# Patient Record
Sex: MALE | URBAN - METROPOLITAN AREA
[De-identification: names, ages, dates, MRNs, and addresses within clinical notes are randomized per-mention and may not be internally consistent; named-entity substitution may affect disease eponyms.]

---

## 2021-09-23 ENCOUNTER — OFFICE VISIT (OUTPATIENT)
Dept: URBAN - METROPOLITAN AREA CLINIC 58 | Facility: CLINIC | Age: 71
End: 2021-09-23
Payer: MEDICARE

## 2021-09-23 DIAGNOSIS — H25.13 AGE-RELATED NUCLEAR CATARACT, BILATERAL: Primary | ICD-10-CM

## 2021-09-23 PROCEDURE — 99203 OFFICE O/P NEW LOW 30 MIN: CPT | Performed by: OPTOMETRIST

## 2021-09-23 ASSESSMENT — VISUAL ACUITY
OS: 20/30
OD: 20/25

## 2021-09-23 ASSESSMENT — INTRAOCULAR PRESSURE
OS: 20
OD: 19

## 2021-09-23 NOTE — IMPRESSION/PLAN
Impression: Age-related nuclear cataract, bilateral: H25.13. Plan: Discussed diagnosis in detail with patient. No treatment is required at this time. Will continue to observe condition and or symptoms. Call if [address] worsens.

## 2024-04-28 ENCOUNTER — OFFICE VISIT (OUTPATIENT)
Dept: URGENT CARE | Facility: PHYSICIAN GROUP | Age: 74
End: 2024-04-28
Payer: MEDICARE

## 2024-04-28 ENCOUNTER — HOSPITAL ENCOUNTER (INPATIENT)
Facility: MEDICAL CENTER | Age: 74
DRG: 981 | End: 2024-04-28
Attending: EMERGENCY MEDICINE | Admitting: STUDENT IN AN ORGANIZED HEALTH CARE EDUCATION/TRAINING PROGRAM
Payer: MEDICARE

## 2024-04-28 ENCOUNTER — APPOINTMENT (OUTPATIENT)
Dept: RADIOLOGY | Facility: IMAGING CENTER | Age: 74
End: 2024-04-28
Attending: NURSE PRACTITIONER
Payer: MEDICARE

## 2024-04-28 VITALS
BODY MASS INDEX: 29.95 KG/M2 | HEIGHT: 74 IN | TEMPERATURE: 98.9 F | SYSTOLIC BLOOD PRESSURE: 106 MMHG | HEART RATE: 65 BPM | RESPIRATION RATE: 22 BRPM | DIASTOLIC BLOOD PRESSURE: 60 MMHG | OXYGEN SATURATION: 95 % | WEIGHT: 233.4 LBS

## 2024-04-28 DIAGNOSIS — R05.8 PRODUCTIVE COUGH: ICD-10-CM

## 2024-04-28 DIAGNOSIS — I50.21 ACUTE HFREF (HEART FAILURE WITH REDUCED EJECTION FRACTION) (HCC): ICD-10-CM

## 2024-04-28 DIAGNOSIS — J18.9 MULTIFOCAL PNEUMONIA: ICD-10-CM

## 2024-04-28 DIAGNOSIS — J18.9 PNEUMONIA DUE TO INFECTIOUS ORGANISM, UNSPECIFIED LATERALITY, UNSPECIFIED PART OF LUNG: Primary | ICD-10-CM

## 2024-04-28 DIAGNOSIS — J96.01 ACUTE RESPIRATORY FAILURE WITH HYPOXIA (HCC): ICD-10-CM

## 2024-04-28 DIAGNOSIS — R06.02 SOB (SHORTNESS OF BREATH): ICD-10-CM

## 2024-04-28 PROBLEM — J45.909 REACTIVE AIRWAY DISEASE: Status: ACTIVE | Noted: 2024-04-28

## 2024-04-28 PROBLEM — Z71.89 ACP (ADVANCE CARE PLANNING): Status: ACTIVE | Noted: 2024-04-28

## 2024-04-28 PROBLEM — I10 HYPERTENSION: Status: ACTIVE | Noted: 2024-04-28

## 2024-04-28 PROBLEM — R79.89 ELEVATED TROPONIN: Status: ACTIVE | Noted: 2024-04-28

## 2024-04-28 PROBLEM — E87.6 HYPOKALEMIA: Status: ACTIVE | Noted: 2024-04-28

## 2024-04-28 PROBLEM — I48.20 CHRONIC A-FIB (HCC): Status: ACTIVE | Noted: 2024-04-28

## 2024-04-28 PROBLEM — E78.5 HYPERLIPIDEMIA: Status: ACTIVE | Noted: 2024-04-28

## 2024-04-28 PROBLEM — E03.9 HYPOTHYROIDISM: Status: ACTIVE | Noted: 2024-04-28

## 2024-04-28 PROBLEM — D72.829 LEUKOCYTOSIS: Status: ACTIVE | Noted: 2024-04-28

## 2024-04-28 LAB
ALBUMIN SERPL BCP-MCNC: 3.2 G/DL (ref 3.2–4.9)
ALBUMIN/GLOB SERPL: 0.8 G/DL
ALP SERPL-CCNC: 115 U/L (ref 30–99)
ALT SERPL-CCNC: 5 U/L (ref 2–50)
ANION GAP SERPL CALC-SCNC: 14 MMOL/L (ref 7–16)
APPEARANCE UR: ABNORMAL
AST SERPL-CCNC: 16 U/L (ref 12–45)
BACTERIA #/AREA URNS HPF: ABNORMAL /HPF
BASOPHILS # BLD AUTO: 0.3 % (ref 0–1.8)
BASOPHILS # BLD: 0.06 K/UL (ref 0–0.12)
BILIRUB SERPL-MCNC: 0.8 MG/DL (ref 0.1–1.5)
BILIRUB UR QL STRIP.AUTO: ABNORMAL
BUN SERPL-MCNC: 18 MG/DL (ref 8–22)
CALCIUM ALBUM COR SERPL-MCNC: 9.4 MG/DL (ref 8.5–10.5)
CALCIUM SERPL-MCNC: 8.8 MG/DL (ref 8.5–10.5)
CHLORIDE SERPL-SCNC: 99 MMOL/L (ref 96–112)
CO2 SERPL-SCNC: 21 MMOL/L (ref 20–33)
COLOR UR: ABNORMAL
CREAT SERPL-MCNC: 0.87 MG/DL (ref 0.5–1.4)
EKG IMPRESSION: NORMAL
EOSINOPHIL # BLD AUTO: 0.03 K/UL (ref 0–0.51)
EOSINOPHIL NFR BLD: 0.2 % (ref 0–6.9)
EPI CELLS #/AREA URNS HPF: NEGATIVE /HPF
ERYTHROCYTE [DISTWIDTH] IN BLOOD BY AUTOMATED COUNT: 51.4 FL (ref 35.9–50)
EXTRA TUBE BLU BLU: NORMAL
GFR SERPLBLD CREATININE-BSD FMLA CKD-EPI: 92 ML/MIN/1.73 M 2
GLOBULIN SER CALC-MCNC: 4.2 G/DL (ref 1.9–3.5)
GLUCOSE SERPL-MCNC: 94 MG/DL (ref 65–99)
GLUCOSE UR STRIP.AUTO-MCNC: NEGATIVE MG/DL
GRAM STN SPEC: NORMAL
HCT VFR BLD AUTO: 36.2 % (ref 42–52)
HGB BLD-MCNC: 11.4 G/DL (ref 14–18)
HYALINE CASTS #/AREA URNS LPF: ABNORMAL /LPF
IMM GRANULOCYTES # BLD AUTO: 0.17 K/UL (ref 0–0.11)
IMM GRANULOCYTES NFR BLD AUTO: 0.9 % (ref 0–0.9)
INR PPP: 1.98 (ref 0.87–1.13)
KETONES UR STRIP.AUTO-MCNC: ABNORMAL MG/DL
LACTATE SERPL-SCNC: 1.5 MMOL/L (ref 0.5–2)
LEUKOCYTE ESTERASE UR QL STRIP.AUTO: ABNORMAL
LYMPHOCYTES # BLD AUTO: 1.34 K/UL (ref 1–4.8)
LYMPHOCYTES NFR BLD: 7.3 % (ref 22–41)
MAGNESIUM SERPL-MCNC: 1.7 MG/DL (ref 1.5–2.5)
MCH RBC QN AUTO: 23.8 PG (ref 27–33)
MCHC RBC AUTO-ENTMCNC: 31.5 G/DL (ref 32.3–36.5)
MCV RBC AUTO: 75.4 FL (ref 81.4–97.8)
MICRO URNS: ABNORMAL
MONOCYTES # BLD AUTO: 1.66 K/UL (ref 0–0.85)
MONOCYTES NFR BLD AUTO: 9 % (ref 0–13.4)
NEUTROPHILS # BLD AUTO: 15.1 K/UL (ref 1.82–7.42)
NEUTROPHILS NFR BLD: 82.3 % (ref 44–72)
NITRITE UR QL STRIP.AUTO: NEGATIVE
NRBC # BLD AUTO: 0 K/UL
NRBC BLD-RTO: 0 /100 WBC (ref 0–0.2)
NT-PROBNP SERPL IA-MCNC: 2655 PG/ML (ref 0–125)
PH UR STRIP.AUTO: 5.5 [PH] (ref 5–8)
PLATELET # BLD AUTO: 445 K/UL (ref 164–446)
PMV BLD AUTO: 8.7 FL (ref 9–12.9)
POTASSIUM SERPL-SCNC: 3.3 MMOL/L (ref 3.6–5.5)
PROT SERPL-MCNC: 7.4 G/DL (ref 6–8.2)
PROT UR QL STRIP: 30 MG/DL
PROTHROMBIN TIME: 22.7 SEC (ref 12–14.6)
RBC # BLD AUTO: 4.8 M/UL (ref 4.7–6.1)
RBC # URNS HPF: >150 /HPF
RBC UR QL AUTO: ABNORMAL
SARS-COV-2 RNA RESP QL NAA+PROBE: NOTDETECTED
SIGNIFICANT IND 70042: NORMAL
SITE SITE: NORMAL
SODIUM SERPL-SCNC: 134 MMOL/L (ref 135–145)
SOURCE SOURCE: NORMAL
SP GR UR STRIP.AUTO: 1.03
SPECIMEN SOURCE: NORMAL
TROPONIN T SERPL-MCNC: 25 NG/L (ref 6–19)
UROBILINOGEN UR STRIP.AUTO-MCNC: 1 MG/DL
WBC # BLD AUTO: 18.4 K/UL (ref 4.8–10.8)
WBC #/AREA URNS HPF: ABNORMAL /HPF

## 2024-04-28 PROCEDURE — 83605 ASSAY OF LACTIC ACID: CPT

## 2024-04-28 PROCEDURE — A9270 NON-COVERED ITEM OR SERVICE: HCPCS | Performed by: EMERGENCY MEDICINE

## 2024-04-28 PROCEDURE — 770020 HCHG ROOM/CARE - TELE (206)

## 2024-04-28 PROCEDURE — 71046 X-RAY EXAM CHEST 2 VIEWS: CPT | Mod: TC | Performed by: NURSE PRACTITIONER

## 2024-04-28 PROCEDURE — 87040 BLOOD CULTURE FOR BACTERIA: CPT

## 2024-04-28 PROCEDURE — 85025 COMPLETE CBC W/AUTO DIFF WBC: CPT

## 2024-04-28 PROCEDURE — 85610 PROTHROMBIN TIME: CPT

## 2024-04-28 PROCEDURE — 700102 HCHG RX REV CODE 250 W/ 637 OVERRIDE(OP): Performed by: STUDENT IN AN ORGANIZED HEALTH CARE EDUCATION/TRAINING PROGRAM

## 2024-04-28 PROCEDURE — 87077 CULTURE AEROBIC IDENTIFY: CPT

## 2024-04-28 PROCEDURE — 99285 EMERGENCY DEPT VISIT HI MDM: CPT

## 2024-04-28 PROCEDURE — 36415 COLL VENOUS BLD VENIPUNCTURE: CPT

## 2024-04-28 PROCEDURE — 700111 HCHG RX REV CODE 636 W/ 250 OVERRIDE (IP): Mod: JZ | Performed by: STUDENT IN AN ORGANIZED HEALTH CARE EDUCATION/TRAINING PROGRAM

## 2024-04-28 PROCEDURE — 81001 URINALYSIS AUTO W/SCOPE: CPT

## 2024-04-28 PROCEDURE — 83735 ASSAY OF MAGNESIUM: CPT

## 2024-04-28 PROCEDURE — 700111 HCHG RX REV CODE 636 W/ 250 OVERRIDE (IP): Mod: JZ | Performed by: EMERGENCY MEDICINE

## 2024-04-28 PROCEDURE — 99223 1ST HOSP IP/OBS HIGH 75: CPT | Mod: 25,AI | Performed by: STUDENT IN AN ORGANIZED HEALTH CARE EDUCATION/TRAINING PROGRAM

## 2024-04-28 PROCEDURE — 700105 HCHG RX REV CODE 258: Performed by: EMERGENCY MEDICINE

## 2024-04-28 PROCEDURE — 96365 THER/PROPH/DIAG IV INF INIT: CPT

## 2024-04-28 PROCEDURE — 94669 MECHANICAL CHEST WALL OSCILL: CPT

## 2024-04-28 PROCEDURE — 700102 HCHG RX REV CODE 250 W/ 637 OVERRIDE(OP): Performed by: EMERGENCY MEDICINE

## 2024-04-28 PROCEDURE — 700105 HCHG RX REV CODE 258: Performed by: STUDENT IN AN ORGANIZED HEALTH CARE EDUCATION/TRAINING PROGRAM

## 2024-04-28 PROCEDURE — 87635 SARS-COV-2 COVID-19 AMP PRB: CPT

## 2024-04-28 PROCEDURE — 99497 ADVNCD CARE PLAN 30 MIN: CPT | Performed by: STUDENT IN AN ORGANIZED HEALTH CARE EDUCATION/TRAINING PROGRAM

## 2024-04-28 PROCEDURE — 87205 SMEAR GRAM STAIN: CPT

## 2024-04-28 PROCEDURE — 84484 ASSAY OF TROPONIN QUANT: CPT

## 2024-04-28 PROCEDURE — 87186 SC STD MICRODIL/AGAR DIL: CPT

## 2024-04-28 PROCEDURE — 93005 ELECTROCARDIOGRAM TRACING: CPT | Performed by: EMERGENCY MEDICINE

## 2024-04-28 PROCEDURE — 87147 CULTURE TYPE IMMUNOLOGIC: CPT

## 2024-04-28 PROCEDURE — 87070 CULTURE OTHR SPECIMN AEROBIC: CPT

## 2024-04-28 PROCEDURE — 93005 ELECTROCARDIOGRAM TRACING: CPT

## 2024-04-28 PROCEDURE — 80053 COMPREHEN METABOLIC PANEL: CPT

## 2024-04-28 PROCEDURE — 83880 ASSAY OF NATRIURETIC PEPTIDE: CPT

## 2024-04-28 PROCEDURE — A9270 NON-COVERED ITEM OR SERVICE: HCPCS | Performed by: STUDENT IN AN ORGANIZED HEALTH CARE EDUCATION/TRAINING PROGRAM

## 2024-04-28 RX ORDER — ALBUTEROL SULFATE 90 UG/1
1-2 AEROSOL, METERED RESPIRATORY (INHALATION) EVERY 4 HOURS PRN
Status: ON HOLD | COMMUNITY

## 2024-04-28 RX ORDER — IPRATROPIUM BROMIDE AND ALBUTEROL SULFATE 2.5; .5 MG/3ML; MG/3ML
3 SOLUTION RESPIRATORY (INHALATION)
Status: DISCONTINUED | OUTPATIENT
Start: 2024-04-28 | End: 2024-05-04 | Stop reason: HOSPADM

## 2024-04-28 RX ORDER — DOXYCYCLINE 100 MG/1
100 TABLET ORAL ONCE
Status: COMPLETED | OUTPATIENT
Start: 2024-04-28 | End: 2024-04-28

## 2024-04-28 RX ORDER — ROSUVASTATIN CALCIUM 10 MG/1
10 TABLET, COATED ORAL EVERY EVENING
Status: DISCONTINUED | OUTPATIENT
Start: 2024-04-28 | End: 2024-05-04 | Stop reason: HOSPADM

## 2024-04-28 RX ORDER — IPRATROPIUM BROMIDE AND ALBUTEROL SULFATE 2.5; .5 MG/3ML; MG/3ML
3 SOLUTION RESPIRATORY (INHALATION)
Status: DISCONTINUED | OUTPATIENT
Start: 2024-04-28 | End: 2024-04-28

## 2024-04-28 RX ORDER — LISINOPRIL AND HYDROCHLOROTHIAZIDE 20; 12.5 MG/1; MG/1
TABLET ORAL
COMMUNITY
End: 2024-04-28

## 2024-04-28 RX ORDER — ALLOPURINOL 300 MG/1
TABLET ORAL
Status: ON HOLD | COMMUNITY
Start: 2024-01-17

## 2024-04-28 RX ORDER — CYCLOBENZAPRINE HCL 5 MG
TABLET ORAL
COMMUNITY
End: 2024-04-28

## 2024-04-28 RX ORDER — MELOXICAM 15 MG/1
TABLET ORAL
COMMUNITY
End: 2024-04-28

## 2024-04-28 RX ORDER — CHOLECALCIFEROL (VITAMIN D3) 125 MCG
2000 CAPSULE ORAL DAILY
Status: ON HOLD | COMMUNITY

## 2024-04-28 RX ORDER — SOTALOL HYDROCHLORIDE 80 MG/1
80 TABLET ORAL 2 TIMES DAILY
Status: DISCONTINUED | OUTPATIENT
Start: 2024-04-28 | End: 2024-05-04 | Stop reason: HOSPADM

## 2024-04-28 RX ORDER — DOXYCYCLINE HYCLATE 100 MG/1
CAPSULE ORAL
COMMUNITY
End: 2024-04-28

## 2024-04-28 RX ORDER — FLUTICASONE FUROATE AND VILANTEROL TRIFENATATE 100; 25 UG/1; UG/1
POWDER RESPIRATORY (INHALATION)
COMMUNITY
Start: 2024-01-31 | End: 2024-04-28

## 2024-04-28 RX ORDER — ALBUTEROL SULFATE 90 UG/1
2 AEROSOL, METERED RESPIRATORY (INHALATION)
Status: DISCONTINUED | OUTPATIENT
Start: 2024-04-28 | End: 2024-05-04 | Stop reason: HOSPADM

## 2024-04-28 RX ORDER — CODEINE PHOSPHATE AND GUAIFENESIN 10; 100 MG/5ML; MG/5ML
SOLUTION ORAL
COMMUNITY
Start: 2024-04-06 | End: 2024-04-28

## 2024-04-28 RX ORDER — FUROSEMIDE 20 MG/1
TABLET ORAL
COMMUNITY
Start: 2024-02-12 | End: 2024-04-28

## 2024-04-28 RX ORDER — GUAIFENESIN/DEXTROMETHORPHAN 100-10MG/5
10 SYRUP ORAL EVERY 6 HOURS PRN
Status: DISCONTINUED | OUTPATIENT
Start: 2024-04-28 | End: 2024-05-04 | Stop reason: HOSPADM

## 2024-04-28 RX ORDER — ALLOPURINOL 300 MG/1
300 TABLET ORAL DAILY
Status: DISCONTINUED | OUTPATIENT
Start: 2024-04-29 | End: 2024-05-04 | Stop reason: HOSPADM

## 2024-04-28 RX ORDER — POTASSIUM CHLORIDE 750 MG/1
CAPSULE, EXTENDED RELEASE ORAL
COMMUNITY
Start: 2024-02-12 | End: 2024-04-28

## 2024-04-28 RX ORDER — PSEUDOEPHEDRINE HCL 30 MG
100 TABLET ORAL DAILY
Status: ON HOLD | COMMUNITY

## 2024-04-28 RX ORDER — ONDANSETRON 4 MG/1
4 TABLET, ORALLY DISINTEGRATING ORAL EVERY 4 HOURS PRN
Status: DISCONTINUED | OUTPATIENT
Start: 2024-04-28 | End: 2024-04-29

## 2024-04-28 RX ORDER — CYCLOBENZAPRINE HCL 10 MG
5 TABLET ORAL 3 TIMES DAILY PRN
Status: DISCONTINUED | OUTPATIENT
Start: 2024-04-28 | End: 2024-04-28

## 2024-04-28 RX ORDER — AZITHROMYCIN 250 MG/1
500 TABLET, FILM COATED ORAL ONCE
Status: DISCONTINUED | OUTPATIENT
Start: 2024-04-28 | End: 2024-04-28

## 2024-04-28 RX ORDER — POTASSIUM CHLORIDE 20 MEQ/1
60 TABLET, EXTENDED RELEASE ORAL ONCE
Status: COMPLETED | OUTPATIENT
Start: 2024-04-28 | End: 2024-04-28

## 2024-04-28 RX ORDER — LABETALOL HYDROCHLORIDE 5 MG/ML
10 INJECTION, SOLUTION INTRAVENOUS EVERY 4 HOURS PRN
Status: DISCONTINUED | OUTPATIENT
Start: 2024-04-28 | End: 2024-05-04 | Stop reason: HOSPADM

## 2024-04-28 RX ORDER — SODIUM CHLORIDE, SODIUM LACTATE, POTASSIUM CHLORIDE, AND CALCIUM CHLORIDE .6; .31; .03; .02 G/100ML; G/100ML; G/100ML; G/100ML
500 INJECTION, SOLUTION INTRAVENOUS
Status: DISCONTINUED | OUTPATIENT
Start: 2024-04-28 | End: 2024-04-29

## 2024-04-28 RX ORDER — SODIUM CHLORIDE, SODIUM LACTATE, POTASSIUM CHLORIDE, CALCIUM CHLORIDE 600; 310; 30; 20 MG/100ML; MG/100ML; MG/100ML; MG/100ML
INJECTION, SOLUTION INTRAVENOUS CONTINUOUS
Status: DISCONTINUED | OUTPATIENT
Start: 2024-04-28 | End: 2024-04-28

## 2024-04-28 RX ORDER — ACETAMINOPHEN 325 MG/1
650 TABLET ORAL EVERY 6 HOURS PRN
Status: DISCONTINUED | OUTPATIENT
Start: 2024-04-28 | End: 2024-05-04 | Stop reason: HOSPADM

## 2024-04-28 RX ORDER — ONDANSETRON 2 MG/ML
4 INJECTION INTRAMUSCULAR; INTRAVENOUS EVERY 4 HOURS PRN
Status: DISCONTINUED | OUTPATIENT
Start: 2024-04-28 | End: 2024-04-29

## 2024-04-28 RX ORDER — LEVOTHYROXINE SODIUM 0.2 MG/1
200 TABLET ORAL DAILY
Status: ON HOLD | COMMUNITY
Start: 2024-04-24

## 2024-04-28 RX ORDER — POLYETHYLENE GLYCOL 3350 17 G/17G
1 POWDER, FOR SOLUTION ORAL
Status: DISCONTINUED | OUTPATIENT
Start: 2024-04-28 | End: 2024-05-04 | Stop reason: HOSPADM

## 2024-04-28 RX ORDER — OMEPRAZOLE 20 MG/1
20 CAPSULE, DELAYED RELEASE ORAL 2 TIMES DAILY
Status: DISCONTINUED | OUTPATIENT
Start: 2024-04-28 | End: 2024-05-04 | Stop reason: HOSPADM

## 2024-04-28 RX ORDER — LEVOTHYROXINE SODIUM 0.15 MG/1
TABLET ORAL
COMMUNITY
End: 2024-04-28

## 2024-04-28 RX ORDER — FLUTICASONE FUROATE AND VILANTEROL 100; 25 UG/1; UG/1
1 POWDER RESPIRATORY (INHALATION)
Status: DISCONTINUED | OUTPATIENT
Start: 2024-04-28 | End: 2024-04-28

## 2024-04-28 RX ORDER — VITAMIN B COMPLEX
2000 TABLET ORAL DAILY
Status: DISCONTINUED | OUTPATIENT
Start: 2024-04-29 | End: 2024-05-04 | Stop reason: HOSPADM

## 2024-04-28 RX ORDER — SOTALOL HYDROCHLORIDE 80 MG/1
1 TABLET ORAL 2 TIMES DAILY
Status: ON HOLD | COMMUNITY
Start: 2023-11-04

## 2024-04-28 RX ORDER — AMOXICILLIN 250 MG
2 CAPSULE ORAL EVERY EVENING
Status: DISCONTINUED | OUTPATIENT
Start: 2024-04-28 | End: 2024-05-04 | Stop reason: HOSPADM

## 2024-04-28 RX ORDER — MONTELUKAST SODIUM 10 MG/1
10 TABLET ORAL DAILY
Status: ON HOLD | COMMUNITY

## 2024-04-28 RX ORDER — MONTELUKAST SODIUM 10 MG/1
10 TABLET ORAL DAILY
Status: DISCONTINUED | OUTPATIENT
Start: 2024-04-29 | End: 2024-05-04 | Stop reason: HOSPADM

## 2024-04-28 RX ORDER — ROSUVASTATIN CALCIUM 10 MG/1
TABLET, COATED ORAL
Status: ON HOLD | COMMUNITY
Start: 2024-01-17

## 2024-04-28 RX ORDER — DOXYCYCLINE 100 MG/1
100 TABLET ORAL EVERY 12 HOURS
Qty: 9 TABLET | Refills: 0 | Status: COMPLETED | OUTPATIENT
Start: 2024-04-29 | End: 2024-05-03

## 2024-04-28 RX ORDER — METHYLPREDNISOLONE 4 MG/1
TABLET ORAL
COMMUNITY
Start: 2024-03-19 | End: 2024-04-28

## 2024-04-28 RX ORDER — SODIUM CHLORIDE, SODIUM LACTATE, POTASSIUM CHLORIDE, CALCIUM CHLORIDE 600; 310; 30; 20 MG/100ML; MG/100ML; MG/100ML; MG/100ML
INJECTION, SOLUTION INTRAVENOUS CONTINUOUS
Status: DISCONTINUED | OUTPATIENT
Start: 2024-04-28 | End: 2024-04-29

## 2024-04-28 RX ORDER — LEVOTHYROXINE SODIUM 0.1 MG/1
200 TABLET ORAL DAILY
Status: DISCONTINUED | OUTPATIENT
Start: 2024-04-29 | End: 2024-05-04 | Stop reason: HOSPADM

## 2024-04-28 RX ORDER — FLUTICASONE FUROATE, UMECLIDINIUM BROMIDE AND VILANTEROL TRIFENATATE 200; 62.5; 25 UG/1; UG/1; UG/1
POWDER RESPIRATORY (INHALATION)
COMMUNITY
Start: 2024-03-13 | End: 2024-04-28

## 2024-04-28 RX ADMIN — APIXABAN 5 MG: 5 TABLET, FILM COATED ORAL at 18:49

## 2024-04-28 RX ADMIN — SODIUM CHLORIDE, POTASSIUM CHLORIDE, SODIUM LACTATE AND CALCIUM CHLORIDE: 600; 310; 30; 20 INJECTION, SOLUTION INTRAVENOUS at 16:43

## 2024-04-28 RX ADMIN — DOXYCYCLINE 100 MG: 100 TABLET, FILM COATED ORAL at 14:49

## 2024-04-28 RX ADMIN — POTASSIUM CHLORIDE 60 MEQ: 1500 TABLET, EXTENDED RELEASE ORAL at 21:57

## 2024-04-28 RX ADMIN — AMPICILLIN AND SULBACTAM 3 G: 1; 2 INJECTION, POWDER, FOR SOLUTION INTRAMUSCULAR; INTRAVENOUS at 14:50

## 2024-04-28 RX ADMIN — SENNOSIDES AND DOCUSATE SODIUM 2 TABLET: 50; 8.6 TABLET ORAL at 18:49

## 2024-04-28 RX ADMIN — SOTALOL HYDROCHLORIDE 80 MG: 80 TABLET ORAL at 18:49

## 2024-04-28 RX ADMIN — AMPICILLIN AND SULBACTAM 3 G: 1; 2 INJECTION, POWDER, FOR SOLUTION INTRAMUSCULAR; INTRAVENOUS at 23:11

## 2024-04-28 RX ADMIN — ROSUVASTATIN 10 MG: 10 TABLET, FILM COATED ORAL at 18:50

## 2024-04-28 RX ADMIN — OMEPRAZOLE 20 MG: 20 CAPSULE, DELAYED RELEASE ORAL at 18:49

## 2024-04-28 ASSESSMENT — COPD QUESTIONNAIRES
DURING THE PAST 4 WEEKS HOW MUCH DID YOU FEEL SHORT OF BREATH: NONE/LITTLE OF THE TIME
DO YOU EVER COUGH UP ANY MUCUS OR PHLEGM?: NO/ONLY WITH OCCASIONAL COLDS OR INFECTIONS
COPD SCREENING SCORE: 2
HAVE YOU SMOKED AT LEAST 100 CIGARETTES IN YOUR ENTIRE LIFE: NO/DON'T KNOW

## 2024-04-28 ASSESSMENT — ENCOUNTER SYMPTOMS
CHILLS: 1
HEADACHES: 0
DIZZINESS: 0
DEPRESSION: 0
DOUBLE VISION: 0
WEAKNESS: 1
HEARTBURN: 0
BLURRED VISION: 0
PALPITATIONS: 0
VOMITING: 0
SHORTNESS OF BREATH: 1
BRUISES/BLEEDS EASILY: 0
FEVER: 1
MYALGIAS: 1
ABDOMINAL PAIN: 0
COUGH: 1
NAUSEA: 0
SPUTUM PRODUCTION: 1

## 2024-04-28 ASSESSMENT — COGNITIVE AND FUNCTIONAL STATUS - GENERAL
DAILY ACTIVITIY SCORE: 24
MOBILITY SCORE: 24
SUGGESTED CMS G CODE MODIFIER DAILY ACTIVITY: CH
SUGGESTED CMS G CODE MODIFIER MOBILITY: CH

## 2024-04-28 ASSESSMENT — FIBROSIS 4 INDEX: FIB4 SCORE: 1.14

## 2024-04-28 ASSESSMENT — PATIENT HEALTH QUESTIONNAIRE - PHQ9
2. FEELING DOWN, DEPRESSED, IRRITABLE, OR HOPELESS: NOT AT ALL
SUM OF ALL RESPONSES TO PHQ9 QUESTIONS 1 AND 2: 0
1. LITTLE INTEREST OR PLEASURE IN DOING THINGS: NOT AT ALL

## 2024-04-28 ASSESSMENT — LIFESTYLE VARIABLES
HAVE YOU EVER FELT YOU SHOULD CUT DOWN ON YOUR DRINKING: NO
SUBSTANCE_ABUSE: 0
ON A TYPICAL DAY WHEN YOU DRINK ALCOHOL HOW MANY DRINKS DO YOU HAVE: 0
AVERAGE NUMBER OF DAYS PER WEEK YOU HAVE A DRINK CONTAINING ALCOHOL: 0
EVER FELT BAD OR GUILTY ABOUT YOUR DRINKING: NO
HAVE PEOPLE ANNOYED YOU BY CRITICIZING YOUR DRINKING: NO
DOES PATIENT WANT TO STOP DRINKING: NO
HOW MANY TIMES IN THE PAST YEAR HAVE YOU HAD 5 OR MORE DRINKS IN A DAY: 0
ALCOHOL_USE: NO
TOTAL SCORE: 0
EVER HAD A DRINK FIRST THING IN THE MORNING TO STEADY YOUR NERVES TO GET RID OF A HANGOVER: NO
TOTAL SCORE: 0
CONSUMPTION TOTAL: NEGATIVE
TOTAL SCORE: 0

## 2024-04-28 NOTE — ASSESSMENT & PLAN NOTE
-Probable demand ischemia, minimally elevated and has remained flat.   -Further ischemic workup with MPI as above.  -Continue statin and Eliquis.

## 2024-04-28 NOTE — ED PROVIDER NOTES
ER Provider Note    Scribed for David Contreras M.D. by Marita Washington. 4/28/2024   1:47 PM    Primary Care Provider: None noted    CHIEF COMPLAINT  Chief Complaint   Patient presents with    Shortness of Breath     Patient sent from urgent care for bilateral lobe pneumonia reading on XRAY. Patient reports ablation on 3/4/2024. Reports cough, shortness or breath, and mucus production since ablation. Denies chest pain and fever.     EXTERNAL RECORDS REVIEWED  The patient has a seen at urgent care and had an X ray that showed multi focal pneumonia. He was also hypoxic.    HPI/ROS    OUTSIDE HISTORIAN(S):  Sony Ly is a 73 y.o. male who presents to the ED for evaluation of shortness of breath worsening since March. The patient had an ablation on 3/4/24. He reports associated cough with green sputum. He denies any chest pain or fever. He denies any respiratory history. He recently moved here from [place]. The patient is not typically on oxygen at home.     PAST MEDICAL HISTORY  None noted    SURGICAL HISTORY  None noted    FAMILY HISTORY  None noted    SOCIAL HISTORY   reports that he has never smoked. His smokeless tobacco use includes chew. He reports that he does not currently use alcohol.    CURRENT MEDICATIONS  Previous Medications    ALBUTEROL (PROAIR HFA) 108 (90 BASE) MCG/ACT AERO SOLN INHALATION AEROSOL    0 Refill(s), Soft Stop    ALLOPURINOL (ZYLOPRIM) 300 MG TAB    Take 1 tablet every day by oral route as directed for 90 days.    APIXABAN (ELIQUIS) 5MG TAB    Take 1 Tablet by mouth 2 times a day.    BREO ELLIPTA 100-25 MCG/ACT AEROSOL POWDER, BREATH ACTIVATED        CHOLECALCIFEROL (VITAMIN D3) 50 MCG (2000 UT) TAB    Take 2,000 Units by mouth every day.    CYCLOBENZAPRINE (FLEXERIL) 5 MG TABLET    Take 1 tablet 3 times a day by oral route as needed for 10 days.    DOCUSATE SODIUM 100 MG CAP    Take 1 capsule twice a day by oral route.    FUROSEMIDE (LASIX) 20 MG TAB        IRON-FOLIC ACID PO  "   Take  by mouth.    LEVOTHYROXINE (SYNTHROID) 150 MCG TAB    Take 1 tablet every day by oral route as directed for 90 days.    LEVOTHYROXINE (SYNTHROID) 200 MCG TAB    Take 200 mcg by mouth every day.    LISINOPRIL-HYDROCHLOROTHIAZIDE (PRINZIDE) 20-12.5 MG PER TABLET    Take 1 tablet every day by oral route as directed for 90 days.    MELOXICAM (MOBIC) 15 MG TABLET    hold as of 3/18/22, 0 Refill(s), Soft Stop    METOPROLOL TARTRATE (LOPRESSOR) 100 MG TAB    Take 50 mg by mouth.    MONTELUKAST (SINGULAIR) 10 MG TAB    Take 1 tablet every day by oral route as directed for 90 days.    NEXIUM 40 MG DELAYED-RELEASE CAPSULE    Take 1 capsule every day by oral route as directed for 90 days.    POTASSIUM CHLORIDE (MICRO-K) 10 MEQ CAPSULE        ROSUVASTATIN (CRESTOR) 10 MG TAB    Take 1 tablet every day by oral route in the evening for 90 days.    SOTALOL (BETAPACE) 80 MG TAB    Take 1 Tablet by mouth 2 times a day.    TRELEGY ELLIPTA 200-62.5-25 MCG/ACT INHALER           ALLERGIES  None noted     PHYSICAL EXAM  /81   Pulse 75   Temp 36.7 °C (98 °F) (Temporal)   Resp 18   Ht 1.88 m (6' 2\")   Wt 106 kg (232 lb 12.9 oz)   SpO2 95% Comment: 4LPM  BMI 29.89 kg/m²    Nursing note and vitals reviewed.  Constitutional: Well-developed and well-nourished. No distress.   HENT: Head is normocephalic and atraumatic. Oropharynx is clear and moist without exudate or erythema.   Eyes: Pupils are equal, round, and reactive to light. Conjunctiva are normal.   Cardiovascular: Normal rate and regular rhythm. No murmur heard. Normal radial pulses.  Pulmonary/Chest: Somewhat diminished breath sounds throughout, scattered rhonchi, No wheezes or rales.   Abdominal: Soft and non-tender. No distention    Musculoskeletal: Extremities exhibit normal range of motion without edema or tenderness.   Neurological: Awake, alert and oriented to person, place, and time. No focal deficits noted.  Skin: Skin is warm and dry. No rash. "   Psychiatric: Normal mood and affect. Appropriate for clinical situation    DIAGNOSTIC STUDIES    Labs:   Results for orders placed or performed during the hospital encounter of 04/28/24   CBC with Differential   Result Value Ref Range    WBC 18.4 (H) 4.8 - 10.8 K/uL    RBC 4.80 4.70 - 6.10 M/uL    Hemoglobin 11.4 (L) 14.0 - 18.0 g/dL    Hematocrit 36.2 (L) 42.0 - 52.0 %    MCV 75.4 (L) 81.4 - 97.8 fL    MCH 23.8 (L) 27.0 - 33.0 pg    MCHC 31.5 (L) 32.3 - 36.5 g/dL    RDW 51.4 (H) 35.9 - 50.0 fL    Platelet Count 445 164 - 446 K/uL    MPV 8.7 (L) 9.0 - 12.9 fL    Neutrophils-Polys 82.30 (H) 44.00 - 72.00 %    Lymphocytes 7.30 (L) 22.00 - 41.00 %    Monocytes 9.00 0.00 - 13.40 %    Eosinophils 0.20 0.00 - 6.90 %    Basophils 0.30 0.00 - 1.80 %    Immature Granulocytes 0.90 0.00 - 0.90 %    Nucleated RBC 0.00 0.00 - 0.20 /100 WBC    Neutrophils (Absolute) 15.10 (H) 1.82 - 7.42 K/uL    Lymphs (Absolute) 1.34 1.00 - 4.80 K/uL    Monos (Absolute) 1.66 (H) 0.00 - 0.85 K/uL    Eos (Absolute) 0.03 0.00 - 0.51 K/uL    Baso (Absolute) 0.06 0.00 - 0.12 K/uL    Immature Granulocytes (abs) 0.17 (H) 0.00 - 0.11 K/uL    NRBC (Absolute) 0.00 K/uL   Comp Metabolic Panel   Result Value Ref Range    Sodium 134 (L) 135 - 145 mmol/L    Potassium 3.3 (L) 3.6 - 5.5 mmol/L    Chloride 99 96 - 112 mmol/L    Co2 21 20 - 33 mmol/L    Anion Gap 14.0 7.0 - 16.0    Glucose 94 65 - 99 mg/dL    Bun 18 8 - 22 mg/dL    Creatinine 0.87 0.50 - 1.40 mg/dL    Calcium 8.8 8.5 - 10.5 mg/dL    Correct Calcium 9.4 8.5 - 10.5 mg/dL    AST(SGOT) 16 12 - 45 U/L    ALT(SGPT) 5 2 - 50 U/L    Alkaline Phosphatase 115 (H) 30 - 99 U/L    Total Bilirubin 0.8 0.1 - 1.5 mg/dL    Albumin 3.2 3.2 - 4.9 g/dL    Total Protein 7.4 6.0 - 8.2 g/dL    Globulin 4.2 (H) 1.9 - 3.5 g/dL    A-G Ratio 0.8 g/dL   Lactic Acid   Result Value Ref Range    Lactic Acid 1.5 0.5 - 2.0 mmol/L   TROPONIN   Result Value Ref Range    Troponin T 25 (H) 6 - 19 ng/L   proBrain Natriuretic  Peptide, NT   Result Value Ref Range    NT-proBNP 2655 (H) 0 - 125 pg/mL   ESTIMATED GFR   Result Value Ref Range    GFR (CKD-EPI) 92 >60 mL/min/1.73 m 2   EKG   Result Value Ref Range    Report       [facility] Emergency Dept.    Test Date:  2024  Pt Name:    ASHLEY DELA CRUZ                 Department: ER  MRN:        [id number]                      Room:  Gender:     Male                         Technician: [id number]  :        1950                   Requested By:ER TRIAGE PROTOCOL  Order #:    [id number]                    Reading MD:    Measurements  Intervals                                Axis  Rate:       69                           P:          -26  IL:         150                          QRS:        -16  QRSD:       115                          T:          47  QT:         497  QTc:        533    Interpretive Statements  Sinus rhythm  Probable left ventricular hypertrophy  ST depr, consider ischemia, anterolateral lds  Prolonged QT interval  No previous ECG available for comparison       INITIAL ASSESSMENT AND PLAN    1:47 PM - Patient was evaluated at bedside for shortness of breath. Ordered for PNP, Troponin, Lactic Acid, Blood Culture, CBC with diff, Cmp and EKG to evaluate.     Chest x-ray consistent with multifocal pneumonia    The patient will be medicated with Ampicillin and Doxycycline 100 mg for his symptoms. The patient presents today with known pneumonia. Informed him of the plan for hospitalization for IV antibiotics. Patient verbalizes understanding and support with my plan of care.        COURSE AND MEDICAL DECISION MAKING    3:07 PM - I discussed the patient's case and the above findings with Dr. Woods (hospitalist) who will consult on the patient for hospitalization.      DISPOSITION AND DISCUSSIONS    I have discussed management of the patient with the following physicians and LEIGH's:  Dr. Woods (hospitalist)     DISPOSITION:  Patient will be hospitalized by   Peggy (hospitalist)  in guarded condition.    FINAL DIAGNOSIS  1. Multifocal pneumonia    2. Acute respiratory failure with hypoxia (HCC)         IMarita (Scribe), am scribing for, and in the presence of, David Contreras M.D..    Electronically signed by: Marita Washington (Scribe), 4/28/2024    David MADDOX M.D. personally performed the services described in this documentation, as scribed by Marita Washington in my presence, and it is both accurate and complete.      The note accurately reflects work and decisions made by me.  David Contreras M.D.  4/28/2024  7:22 PM

## 2024-04-28 NOTE — ED NOTES
Patient remains restin in bed with family at bedside. Patient uses urinal when he needs to pee. No other

## 2024-04-28 NOTE — H&P
Hospital Medicine History & Physical Note    Date of Service  4/28/2024    Primary Care Physician  Pcp Pt States None    Consultants  None    Code Status  Full Code    Chief Complaint  Chief Complaint   Patient presents with    Shortness of Breath     Patient sent from urgent care for bilateral lobe pneumonia reading on XRAY. Patient reports ablation on 3/4/2024. Reports cough, shortness or breath, and mucus production since ablation. Denies chest pain and fever.       History of Presenting Illness  Ervin Ly is a 73 y.o. male with h/o chronic afib on Eliquis, prior ablation, HTN, HLD, gout, GERD, reactive airway disease who presented 4/28/2024 with evaluation for SOB. Patient reported not feeling well for the past 3weeks, endorsed subjective fever, chills, productive sputum, weakness. He reported traveling to [place] recently, denies sick contact. He was seen at urgent care earlier, due to concern of multifocal pneumonia on CXR and hypoxia, referred to ER. In ER, he is requiring 2-4L NC. He received Unasyn and doxycyline in ER. Admission requested by ERP. Admitted to medicine service for further evaluation and treatment.    I discussed the plan of care with patient, family, bedside RN, and pharmacy.    Review of Systems  Review of Systems   Constitutional:  Positive for chills, fever and malaise/fatigue.   HENT:  Negative for hearing loss and tinnitus.    Eyes:  Negative for blurred vision and double vision.   Respiratory:  Positive for cough, sputum production and shortness of breath.    Cardiovascular:  Negative for chest pain, palpitations and leg swelling.   Gastrointestinal:  Negative for abdominal pain, heartburn, nausea and vomiting.   Genitourinary:  Negative for dysuria and urgency.   Musculoskeletal:  Positive for myalgias. Negative for joint pain.   Skin:  Negative for itching and rash.   Neurological:  Positive for weakness. Negative for dizziness and headaches.   Endo/Heme/Allergies:  Negative  for environmental allergies. Does not bruise/bleed easily.   Psychiatric/Behavioral:  Negative for depression and substance abuse.    All other systems reviewed and are negative.      Past Medical History   has no past medical history on file.    Surgical History   has no past surgical history on file.     Family History  family history is not on file.   Family history reviewed with patient. There is no family history that is pertinent to the chief complaint.     Social History   reports that he has never smoked. His smokeless tobacco use includes chew. He reports that he does not currently use alcohol.    Allergies  No Known Allergies    Medications  Prior to Admission Medications   Prescriptions Last Dose Informant Patient Reported? Taking?   BREO ELLIPTA 100-25 MCG/ACT AEROSOL POWDER, BREATH ACTIVATED   Yes No   Cholecalciferol (VITAMIN D3) 50 MCG (2000 UT) Tab   Yes No   Sig: Take 2,000 Units by mouth every day.   IRON-FOLIC ACID PO   Yes No   Sig: Take  by mouth.   NEXIUM 40 MG delayed-release capsule   Yes No   Sig: Take 1 capsule every day by oral route as directed for 90 days.   TRELEGY ELLIPTA 200-62.5-25 MCG/ACT inhaler   Yes No   albuterol (PROAIR HFA) 108 (90 Base) MCG/ACT Aero Soln inhalation aerosol   Yes No   Si Refill(s), Soft Stop   allopurinol (ZYLOPRIM) 300 MG Tab   Yes No   Sig: Take 1 tablet every day by oral route as directed for 90 days.   apixaban (ELIQUIS) 5mg Tab   Yes No   Sig: Take 1 Tablet by mouth 2 times a day.   cyclobenzaprine (FLEXERIL) 5 mg tablet   Yes No   Sig: Take 1 tablet 3 times a day by oral route as needed for 10 days.   docusate sodium 100 MG Cap   Yes No   Sig: Take 1 capsule twice a day by oral route.   furosemide (LASIX) 20 MG Tab   Yes No   levothyroxine (SYNTHROID) 150 MCG Tab   Yes No   Sig: Take 1 tablet every day by oral route as directed for 90 days.   levothyroxine (SYNTHROID) 200 MCG Tab   Yes No   Sig: Take 200 mcg by mouth every day.    lisinopril-hydrochlorothiazide (PRINZIDE) 20-12.5 MG per tablet   Yes No   Sig: Take 1 tablet every day by oral route as directed for 90 days.   meloxicam (MOBIC) 15 MG tablet   Yes No   Sig: hold as of 3/18/22, 0 Refill(s), Soft Stop   metoprolol tartrate (LOPRESSOR) 100 MG Tab   Yes No   Sig: Take 50 mg by mouth.   montelukast (SINGULAIR) 10 MG Tab   Yes No   Sig: Take 1 tablet every day by oral route as directed for 90 days.   potassium chloride (MICRO-K) 10 MEQ capsule   Yes No   rosuvastatin (CRESTOR) 10 MG Tab   Yes No   Sig: Take 1 tablet every day by oral route in the evening for 90 days.   sotalol (BETAPACE) 80 MG Tab   Yes No   Sig: Take 1 Tablet by mouth 2 times a day.      Facility-Administered Medications: None       Physical Exam  Temp:  [36.7 °C (98 °F)-37.2 °C (98.9 °F)] 36.7 °C (98 °F)  Pulse:  [] 159  Resp:  [16-22] 20  BP: (106-137)/(57-81) 108/75  SpO2:  [89 %-95 %] 94 %  Blood Pressure : 108/75   Temperature: 36.7 °C (98 °F)   Pulse: (!) 159   Respiration: 20   Pulse Oximetry: 94 %       Physical Exam  Vitals and nursing note reviewed.   Constitutional:       General: He is not in acute distress.  HENT:      Head: Normocephalic and atraumatic.      Nose: Nose normal.      Mouth/Throat:      Mouth: Mucous membranes are dry.      Pharynx: Oropharynx is clear.   Eyes:      General: No scleral icterus.     Extraocular Movements: Extraocular movements intact.   Cardiovascular:      Rate and Rhythm: Normal rate and regular rhythm.      Pulses: Normal pulses.      Heart sounds:      No friction rub.   Pulmonary:      Effort: No respiratory distress.      Breath sounds: No stridor. Rales present. No wheezing.   Chest:      Chest wall: No tenderness.   Abdominal:      General: There is no distension.      Tenderness: There is no abdominal tenderness. There is no guarding or rebound.   Musculoskeletal:         General: Normal range of motion.      Cervical back: Neck supple. No tenderness.       Right lower leg: No edema.      Left lower leg: No edema.   Skin:     General: Skin is warm and dry.   Neurological:      General: No focal deficit present.      Mental Status: He is alert and oriented to person, place, and time.   Psychiatric:         Mood and Affect: Mood normal.         Laboratory:  Recent Labs     04/28/24  1321   WBC 18.4*   RBC 4.80   HEMOGLOBIN 11.4*   HEMATOCRIT 36.2*   MCV 75.4*   MCH 23.8*   MCHC 31.5*   RDW 51.4*   PLATELETCT 445   MPV 8.7*     Recent Labs     04/28/24  1321   SODIUM 134*   POTASSIUM 3.3*   CHLORIDE 99   CO2 21   GLUCOSE 94   BUN 18   CREATININE 0.87   CALCIUM 8.8     Recent Labs     04/28/24  1321   ALTSGPT 5   ASTSGOT 16   ALKPHOSPHAT 115*   TBILIRUBIN 0.8   GLUCOSE 94         Recent Labs     04/28/24  1321   NTPROBNP 2655*         Recent Labs     04/28/24  1321   TROPONINT 25*       Imaging:  EC-ECHOCARDIOGRAM COMPLETE W/O CONT    (Results Pending)             X-Ray:  I have personally reviewed the images and compared with prior images.  EKG:  I have personally reviewed the images and compared with prior images.    Assessment/Plan:  Justification for Admission Status  I anticipate this patient will require at least 2 midnights hospitalization, therefore appropriate for inpatient status.      * Multifocal pneumonia- (present on admission)  Assessment & Plan  WBC 18.4 K, concern of bilateral infiltrates on CXR, requiring 4 L nasal cannula support  Follow cultures  Antibiotic: Unasyn, doxycycline    Acute respiratory failure with hypoxia (HCC)  Assessment & Plan  Requiring 2 to 4 L nasal cannula support in ER  - Wean off as tolerated  Likely secondary to pneumonia  -check COVID/influenza  Check echo    Reactive airway disease  Assessment & Plan  Continue home regimen  Currently not in acute exacerbation    Hypertension  Assessment & Plan  Home meds    Leukocytosis  Assessment & Plan  IVF, antibiotic  Trend    Chronic a-fib (HCC)  Assessment & Plan  Continue Eliquis,  sotalol    Hypokalemia  Assessment & Plan  Likely due to HCTZ use  Replace  Place Mg    ACP (advance care planning)  Assessment & Plan  Goal of care discussed with patient and patient's wife in ER. Patient is agreeable noninvasive as well as invasive/heroic life-sustaining measures--including CPR/defibrillation/intubation or mechanical ventilation if needed. He is also agreeable for IV antibiotic, breathing treatment as needed, any further medical management as clinically warranted. Diagnosis, prognosis, questions and concerns addressed. FULL code status confirmed with patient. ACP: 16minutes.    Elevated troponin  Assessment & Plan  Probable demand ischemia  Trend CE, EKG  Eliquis, statin  Check echo    Hypothyroidism  Assessment & Plan  Synthroid    Hyperlipidemia  Assessment & Plan  Rosuvastatin      VTE prophylaxis: therapeutic anticoagulation with Eliquis

## 2024-04-28 NOTE — ASSESSMENT & PLAN NOTE
-Multifactorial from pneumonia and HFrEF.  -Continue antibiotics and diuresis.  -Further cardiac ischemic workup as above.  -Continue respiratory support with RT protocol.  Continue supplemental oxygen, keep saturation above 88% and wean as able.

## 2024-04-28 NOTE — ASSESSMENT & PLAN NOTE
- Chest x-ray showed bilateral perihilar and lower lobe parenchymal opacities.  Still requiring supplemental oxygen.  Sputum cultures growing MSSA.  -Continue antibiotics with IV Unasyn and doxycycline.  -Continue respiratory support with RT protocol, supplemental oxygen.  Keep saturations above 88%, wean as able from O2.   -Trend WBC count and procalcitonin.

## 2024-04-28 NOTE — ED NOTES
Michael brought to room via wheelchair for comfort, gowned, and placed on monitors (BP, Cardiac, Pulse Ox). Patient is low fall risk. Standard fall risk precautions in place including bed in lowest position, side rails up, call light within reach, and area free of clutter. Patient requires 2L O2 NC to maintain SPO2 >90%. Chart up for ERP. Patient presented at 87% on RA

## 2024-04-28 NOTE — ED TRIAGE NOTES
Chief Complaint   Patient presents with    Shortness of Breath     Patient sent from urgent care for bilateral lobe pneumonia reading on XRAY. Patient reports ablation on 3/4/2024. Reports cough, shortness or breath, and mucus production since ablation. Denies chest pain and fever.     Patient placed on 4L NC in lobby to maintain SpO2 > 90%. Denies home O2 use.

## 2024-04-28 NOTE — ASSESSMENT & PLAN NOTE
-Maintaining good blood pressure control.  Continue sotalol. Monitor blood pressure trend closely, continue as needed IV labetalol for significant symptomatic hypertension.  Optimize blood pressure control.

## 2024-04-28 NOTE — PROGRESS NOTES
Ervin Ly is a 73 y.o. male who presents for Cough (Phlegm, congestion, low O2, difficulty taking deep breath, x2 weeks )    Accompanied by his niece.   HPI  This is a new problem. Ervin Ly is a 73 y.o. patient who presents to urgent care with c/o: 3-4 weeks of productive cough. Yesterday felt sob. O2Spo2 mid 80's to 89. Feeling light headed. No fevers.   Ablation in March of 2024. The next few days he had tickle in through and then started to get mucous in throat. Cough started after that.  He is currently traveling in . Came from [place] last night.       ROS See HPI    Allergies:     No Known Allergies    PMSFS Hx:  History reviewed. No pertinent past medical history.  History reviewed. No pertinent surgical history.  History reviewed. No pertinent family history.  Social History     Tobacco Use    Smoking status: Never    Smokeless tobacco: Current     Types: Chew   Substance Use Topics    Alcohol use: Not Currently       Problems:   There is no problem list on file for this patient.      Medications:   Current Outpatient Medications on File Prior to Visit   Medication Sig Dispense Refill    albuterol (PROAIR HFA) 108 (90 Base) MCG/ACT Aero Soln inhalation aerosol 0 Refill(s), Soft Stop      allopurinol (ZYLOPRIM) 300 MG Tab Take 1 tablet every day by oral route as directed for 90 days.      apixaban (ELIQUIS) 5mg Tab Take 1 Tablet by mouth 2 times a day.      Cholecalciferol (VITAMIN D3) 50 MCG (2000 UT) Tab Take 2,000 Units by mouth every day.      cyclobenzaprine (FLEXERIL) 5 mg tablet Take 1 tablet 3 times a day by oral route as needed for 10 days.      docusate sodium 100 MG Cap Take 1 capsule twice a day by oral route.      NEXIUM 40 MG delayed-release capsule Take 1 capsule every day by oral route as directed for 90 days.      BREO ELLIPTA 100-25 MCG/ACT AEROSOL POWDER, BREATH ACTIVATED       TRELEGY ELLIPTA 200-62.5-25 MCG/ACT inhaler       furosemide (LASIX) 20 MG Tab        "levothyroxine (SYNTHROID) 150 MCG Tab Take 1 tablet every day by oral route as directed for 90 days.      levothyroxine (SYNTHROID) 200 MCG Tab Take 200 mcg by mouth every day.      lisinopril-hydrochlorothiazide (PRINZIDE) 20-12.5 MG per tablet Take 1 tablet every day by oral route as directed for 90 days.      meloxicam (MOBIC) 15 MG tablet hold as of 3/18/22, 0 Refill(s), Soft Stop      metoprolol tartrate (LOPRESSOR) 100 MG Tab Take 50 mg by mouth.      montelukast (SINGULAIR) 10 MG Tab Take 1 tablet every day by oral route as directed for 90 days.      potassium chloride (MICRO-K) 10 MEQ capsule       rosuvastatin (CRESTOR) 10 MG Tab Take 1 tablet every day by oral route in the evening for 90 days.      sotalol (BETAPACE) 80 MG Tab Take 1 Tablet by mouth 2 times a day.      IRON-FOLIC ACID PO Take  by mouth.       No current facility-administered medications on file prior to visit.        Objective:     /60 (BP Location: Left arm, Patient Position: Sitting, BP Cuff Size: Adult)   Pulse 68   Temp 37.2 °C (98.9 °F) (Temporal)   Resp 16   Ht 1.88 m (6' 2\")   Wt 106 kg (233 lb 6.4 oz)   SpO2 89%   BMI 29.97 kg/m²     Physical Exam  Nursing note reviewed.   Constitutional:       General: He is not in acute distress.     Appearance: Normal appearance. He is well-developed and well-groomed. He is not toxic-appearing.   HENT:      Head: Normocephalic and atraumatic.      Right Ear: External ear normal.      Left Ear: External ear normal.      Nose: Nose normal.   Eyes:      General:         Right eye: No discharge.         Left eye: No discharge.      Conjunctiva/sclera: Conjunctivae normal.      Pupils: Pupils are equal, round, and reactive to light.   Cardiovascular:      Rate and Rhythm: Normal rate and regular rhythm.      Pulses: Normal pulses.      Heart sounds: Normal heart sounds.   Pulmonary:      Effort: Pulmonary effort is normal. No accessory muscle usage or respiratory distress.      Breath " sounds: Decreased air movement present. Decreased breath sounds and rhonchi present. No wheezing.   Musculoskeletal:      Cervical back: Neck supple.   Lymphadenopathy:      Cervical: No cervical adenopathy.      Upper Body:      Right upper body: No supraclavicular adenopathy.      Left upper body: No supraclavicular adenopathy.   Skin:     General: Skin is warm and dry.      Capillary Refill: Capillary refill takes less than 2 seconds.   Neurological:      Mental Status: He is alert and oriented to person, place, and time.   Psychiatric:         Mood and Affect: Mood normal.         Behavior: Behavior normal.       .    RADIOLOGY RESULTS   DX-CHEST-2 VIEWS    Result Date: 4/28/2024 4/28/2024 11:57 AM HISTORY/REASON FOR EXAM:  Shortness of Breath; productive cough, Low oxygen saturation TECHNIQUE/EXAM DESCRIPTION AND NUMBER OF VIEWS: Two views of the chest. COMPARISON:  None available. FINDINGS: The mediastinal and cardiac silhouette is upper limits of normal. There is perihilar bronchial wall thickening. There are patchy mid and lower hemithorax parenchymal opacities and interstitial opacities. There is no significant pleural effusion. There is no visible pneumothorax. There are no acute bony abnormalities.     1.  There are bilateral perihilar and lower lobe parenchymal opacities which could be due to combination of edema and multifocal pneumonia.         Xray: Reviewed and interpreted independently by me. I agree with the radiologist's findings.     Assessment /Associated Orders:      1. Pneumonia due to infectious organism, unspecified laterality, unspecified part of lung  UC AMA/Refusal of Treatment      2. SOB (shortness of breath)  DX-CHEST-2 VIEWS    UC AMA/Refusal of Treatment      3. Productive cough  DX-CHEST-2 VIEWS    UC AMA/Refusal of Treatment          Medical Decision Making:    Ervin  is a very pleasant 73 y.o. male   Pt's clinical presentation and exam today indicate a need for higher level of  care with further evaluation and/or diagnostics.  He is requiring supplemental oxygen in urgent care today.  [facility] was  called to arrange transfer to higher level of care in ER.  Pt is to be transported via POV.   I have reiterated to patient that although an Urgent Care to ER transfer was made this will not necessarily expedite the ER process        Please note that this dictation was created using voice recognition software. I have worked with consultants from the vendor as well as technical experts from [facility] to optimize the interface. I have made every reasonable attempt to correct obvious errors, but I expect that there are errors of grammar and possibly content that I did not discover before finalizing the note.  This note was electronically signed by provider

## 2024-04-28 NOTE — ASSESSMENT & PLAN NOTE
- Maintaining rate control.  Continue sotalol.  Continue anticoagulation with Eliquis.  Continue to monitor on telemetry.

## 2024-04-28 NOTE — ASSESSMENT & PLAN NOTE
- At an acute exacerbation.  Continue as needed bronchodilators, along with home Singulair.  -Continue RT protocol.

## 2024-04-28 NOTE — PROGRESS NOTES
Med Rec complete per pt  Allergies Reviewed    Pt takes SOTALOL and METOPROLOL    Pt takes ELIQUIS last dose 4/28 AM

## 2024-04-28 NOTE — ASSESSMENT & PLAN NOTE
- Replace with 40 mEq of oral K-Dur.  Magnesium level was normal.  -Continue to monitor, BMP in the morning.

## 2024-04-29 ENCOUNTER — APPOINTMENT (OUTPATIENT)
Dept: CARDIOLOGY | Facility: MEDICAL CENTER | Age: 74
DRG: 981 | End: 2024-04-29
Attending: STUDENT IN AN ORGANIZED HEALTH CARE EDUCATION/TRAINING PROGRAM
Payer: MEDICARE

## 2024-04-29 PROBLEM — I50.9 CHF (CONGESTIVE HEART FAILURE) (HCC): Status: ACTIVE | Noted: 2024-04-29

## 2024-04-29 PROBLEM — R94.31 QT PROLONGATION: Status: ACTIVE | Noted: 2024-04-29

## 2024-04-29 LAB
ANION GAP SERPL CALC-SCNC: 12 MMOL/L (ref 7–16)
APPEARANCE UR: CLEAR
BACTERIA #/AREA URNS HPF: NEGATIVE /HPF
BACTERIA BLD CULT: NORMAL
BACTERIA BLD CULT: NORMAL
BASOPHILS # BLD AUTO: 0.4 % (ref 0–1.8)
BASOPHILS # BLD: 0.06 K/UL (ref 0–0.12)
BILIRUB UR QL STRIP.AUTO: ABNORMAL
BUN SERPL-MCNC: 14 MG/DL (ref 8–22)
CALCIUM SERPL-MCNC: 8.7 MG/DL (ref 8.5–10.5)
CHLORIDE SERPL-SCNC: 102 MMOL/L (ref 96–112)
CO2 SERPL-SCNC: 24 MMOL/L (ref 20–33)
COLOR UR: ABNORMAL
CREAT SERPL-MCNC: 0.71 MG/DL (ref 0.5–1.4)
EOSINOPHIL # BLD AUTO: 0.05 K/UL (ref 0–0.51)
EOSINOPHIL NFR BLD: 0.3 % (ref 0–6.9)
EPI CELLS #/AREA URNS HPF: ABNORMAL /HPF
ERYTHROCYTE [DISTWIDTH] IN BLOOD BY AUTOMATED COUNT: 52.6 FL (ref 35.9–50)
ERYTHROCYTE [DISTWIDTH] IN BLOOD BY AUTOMATED COUNT: 52.6 FL (ref 35.9–50)
GFR SERPLBLD CREATININE-BSD FMLA CKD-EPI: 98 ML/MIN/1.73 M 2
GLUCOSE SERPL-MCNC: 123 MG/DL (ref 65–99)
GLUCOSE UR STRIP.AUTO-MCNC: NEGATIVE MG/DL
HCT VFR BLD AUTO: 33 % (ref 42–52)
HCT VFR BLD AUTO: 33 % (ref 42–52)
HEMOCCULT STL QL: NEGATIVE
HGB BLD-MCNC: 10.3 G/DL (ref 14–18)
HGB BLD-MCNC: 10.3 G/DL (ref 14–18)
HYALINE CASTS #/AREA URNS LPF: ABNORMAL /LPF
IMM GRANULOCYTES # BLD AUTO: 0.1 K/UL (ref 0–0.11)
IMM GRANULOCYTES NFR BLD AUTO: 0.6 % (ref 0–0.9)
IRON SATN MFR SERPL: 10 % (ref 15–55)
IRON SERPL-MCNC: 16 UG/DL (ref 50–180)
KETONES UR STRIP.AUTO-MCNC: ABNORMAL MG/DL
LEUKOCYTE ESTERASE UR QL STRIP.AUTO: ABNORMAL
LV EJECT FRACT  99904: 45
LV EJECT FRACT MOD 2C 99903: 46.46
LV EJECT FRACT MOD 4C 99902: 45.26
LV EJECT FRACT MOD BP 99901: 44.97
LYMPHOCYTES # BLD AUTO: 1.17 K/UL (ref 1–4.8)
LYMPHOCYTES NFR BLD: 7.3 % (ref 22–41)
MCH RBC QN AUTO: 24 PG (ref 27–33)
MCH RBC QN AUTO: 24 PG (ref 27–33)
MCHC RBC AUTO-ENTMCNC: 31.2 G/DL (ref 32.3–36.5)
MCHC RBC AUTO-ENTMCNC: 31.2 G/DL (ref 32.3–36.5)
MCV RBC AUTO: 76.7 FL (ref 81.4–97.8)
MCV RBC AUTO: 76.7 FL (ref 81.4–97.8)
MICRO URNS: ABNORMAL
MONOCYTES # BLD AUTO: 1.65 K/UL (ref 0–0.85)
MONOCYTES NFR BLD AUTO: 10.3 % (ref 0–13.4)
MUCOUS THREADS #/AREA URNS HPF: ABNORMAL /HPF
NEUTROPHILS # BLD AUTO: 12.95 K/UL (ref 1.82–7.42)
NEUTROPHILS NFR BLD: 81.1 % (ref 44–72)
NITRITE UR QL STRIP.AUTO: NEGATIVE
NRBC # BLD AUTO: 0 K/UL
NRBC BLD-RTO: 0 /100 WBC (ref 0–0.2)
PH UR STRIP.AUTO: 5.5 [PH] (ref 5–8)
PLATELET # BLD AUTO: 348 K/UL (ref 164–446)
PLATELET # BLD AUTO: 348 K/UL (ref 164–446)
PMV BLD AUTO: 8.6 FL (ref 9–12.9)
PMV BLD AUTO: 8.6 FL (ref 9–12.9)
POTASSIUM SERPL-SCNC: 3.6 MMOL/L (ref 3.6–5.5)
PROCALCITONIN SERPL-MCNC: 0.13 NG/ML
PROT UR QL STRIP: NEGATIVE MG/DL
RBC # BLD AUTO: 4.3 M/UL (ref 4.7–6.1)
RBC # BLD AUTO: 4.3 M/UL (ref 4.7–6.1)
RBC # URNS HPF: ABNORMAL /HPF
RBC UR QL AUTO: NEGATIVE
SIGNIFICANT IND 70042: NORMAL
SIGNIFICANT IND 70042: NORMAL
SITE SITE: NORMAL
SITE SITE: NORMAL
SODIUM SERPL-SCNC: 138 MMOL/L (ref 135–145)
SOURCE SOURCE: NORMAL
SOURCE SOURCE: NORMAL
SP GR UR STRIP.AUTO: 1.03
TIBC SERPL-MCNC: 157 UG/DL (ref 250–450)
TROPONIN T SERPL-MCNC: 25 NG/L (ref 6–19)
TSH SERPL DL<=0.005 MIU/L-ACNC: 0.43 UIU/ML (ref 0.38–5.33)
UIBC SERPL-MCNC: 141 UG/DL (ref 110–370)
UROBILINOGEN UR STRIP.AUTO-MCNC: 1 MG/DL
WBC # BLD AUTO: 15.5 K/UL (ref 4.8–10.8)
WBC # BLD AUTO: 15.5 K/UL (ref 4.8–10.8)
WBC #/AREA URNS HPF: ABNORMAL /HPF
YEAST BUDDING URNS QL: PRESENT /HPF

## 2024-04-29 PROCEDURE — 700105 HCHG RX REV CODE 258: Performed by: STUDENT IN AN ORGANIZED HEALTH CARE EDUCATION/TRAINING PROGRAM

## 2024-04-29 PROCEDURE — 84484 ASSAY OF TROPONIN QUANT: CPT

## 2024-04-29 PROCEDURE — 84443 ASSAY THYROID STIM HORMONE: CPT

## 2024-04-29 PROCEDURE — 700102 HCHG RX REV CODE 250 W/ 637 OVERRIDE(OP): Performed by: STUDENT IN AN ORGANIZED HEALTH CARE EDUCATION/TRAINING PROGRAM

## 2024-04-29 PROCEDURE — 82272 OCCULT BLD FECES 1-3 TESTS: CPT

## 2024-04-29 PROCEDURE — 99222 1ST HOSP IP/OBS MODERATE 55: CPT | Performed by: INTERNAL MEDICINE

## 2024-04-29 PROCEDURE — 99232 SBSQ HOSP IP/OBS MODERATE 35: CPT | Performed by: HOSPITALIST

## 2024-04-29 PROCEDURE — 83550 IRON BINDING TEST: CPT

## 2024-04-29 PROCEDURE — 770020 HCHG ROOM/CARE - TELE (206)

## 2024-04-29 PROCEDURE — 83540 ASSAY OF IRON: CPT

## 2024-04-29 PROCEDURE — 700102 HCHG RX REV CODE 250 W/ 637 OVERRIDE(OP): Mod: JZ | Performed by: HOSPITALIST

## 2024-04-29 PROCEDURE — 93306 TTE W/DOPPLER COMPLETE: CPT

## 2024-04-29 PROCEDURE — 80048 BASIC METABOLIC PNL TOTAL CA: CPT

## 2024-04-29 PROCEDURE — 700111 HCHG RX REV CODE 636 W/ 250 OVERRIDE (IP): Mod: JZ | Performed by: STUDENT IN AN ORGANIZED HEALTH CARE EDUCATION/TRAINING PROGRAM

## 2024-04-29 PROCEDURE — 93306 TTE W/DOPPLER COMPLETE: CPT | Mod: 26 | Performed by: INTERNAL MEDICINE

## 2024-04-29 PROCEDURE — 700111 HCHG RX REV CODE 636 W/ 250 OVERRIDE (IP): Mod: JZ | Performed by: HOSPITALIST

## 2024-04-29 PROCEDURE — A9270 NON-COVERED ITEM OR SERVICE: HCPCS | Performed by: STUDENT IN AN ORGANIZED HEALTH CARE EDUCATION/TRAINING PROGRAM

## 2024-04-29 PROCEDURE — 700117 HCHG RX CONTRAST REV CODE 255: Performed by: STUDENT IN AN ORGANIZED HEALTH CARE EDUCATION/TRAINING PROGRAM

## 2024-04-29 PROCEDURE — A9270 NON-COVERED ITEM OR SERVICE: HCPCS | Mod: JZ | Performed by: HOSPITALIST

## 2024-04-29 PROCEDURE — 36415 COLL VENOUS BLD VENIPUNCTURE: CPT

## 2024-04-29 PROCEDURE — 84145 PROCALCITONIN (PCT): CPT

## 2024-04-29 PROCEDURE — 94669 MECHANICAL CHEST WALL OSCILL: CPT

## 2024-04-29 PROCEDURE — 85025 COMPLETE CBC W/AUTO DIFF WBC: CPT

## 2024-04-29 RX ORDER — FUROSEMIDE 10 MG/ML
20 INJECTION INTRAMUSCULAR; INTRAVENOUS
Status: DISCONTINUED | OUTPATIENT
Start: 2024-04-29 | End: 2024-05-01

## 2024-04-29 RX ORDER — POTASSIUM CHLORIDE 20 MEQ/1
20 TABLET, EXTENDED RELEASE ORAL ONCE
Status: COMPLETED | OUTPATIENT
Start: 2024-04-29 | End: 2024-04-29

## 2024-04-29 RX ORDER — MAGNESIUM SULFATE HEPTAHYDRATE 40 MG/ML
2 INJECTION, SOLUTION INTRAVENOUS ONCE
Status: COMPLETED | OUTPATIENT
Start: 2024-04-29 | End: 2024-04-29

## 2024-04-29 RX ADMIN — DOXYCYCLINE 100 MG: 100 TABLET, FILM COATED ORAL at 05:18

## 2024-04-29 RX ADMIN — AMPICILLIN AND SULBACTAM 3 G: 1; 2 INJECTION, POWDER, FOR SOLUTION INTRAMUSCULAR; INTRAVENOUS at 12:25

## 2024-04-29 RX ADMIN — SOTALOL HYDROCHLORIDE 80 MG: 80 TABLET ORAL at 05:17

## 2024-04-29 RX ADMIN — MAGNESIUM SULFATE HEPTAHYDRATE 2 G: 2 INJECTION, SOLUTION INTRAVENOUS at 15:43

## 2024-04-29 RX ADMIN — LEVOTHYROXINE SODIUM 200 MCG: 0.1 TABLET ORAL at 05:18

## 2024-04-29 RX ADMIN — ROSUVASTATIN 10 MG: 10 TABLET, FILM COATED ORAL at 18:29

## 2024-04-29 RX ADMIN — APIXABAN 5 MG: 5 TABLET, FILM COATED ORAL at 05:17

## 2024-04-29 RX ADMIN — MONTELUKAST 10 MG: 10 TABLET, FILM COATED ORAL at 05:19

## 2024-04-29 RX ADMIN — AMPICILLIN AND SULBACTAM 3 G: 1; 2 INJECTION, POWDER, FOR SOLUTION INTRAMUSCULAR; INTRAVENOUS at 18:25

## 2024-04-29 RX ADMIN — OMEPRAZOLE 20 MG: 20 CAPSULE, DELAYED RELEASE ORAL at 05:19

## 2024-04-29 RX ADMIN — OMEPRAZOLE 20 MG: 20 CAPSULE, DELAYED RELEASE ORAL at 18:29

## 2024-04-29 RX ADMIN — Medication 2000 UNITS: at 05:17

## 2024-04-29 RX ADMIN — AMPICILLIN AND SULBACTAM 3 G: 1; 2 INJECTION, POWDER, FOR SOLUTION INTRAMUSCULAR; INTRAVENOUS at 05:23

## 2024-04-29 RX ADMIN — ALLOPURINOL 300 MG: 300 TABLET ORAL at 05:17

## 2024-04-29 RX ADMIN — SOTALOL HYDROCHLORIDE 80 MG: 80 TABLET ORAL at 18:29

## 2024-04-29 RX ADMIN — APIXABAN 5 MG: 5 TABLET, FILM COATED ORAL at 18:29

## 2024-04-29 RX ADMIN — HUMAN ALBUMIN MICROSPHERES AND PERFLUTREN 3 ML: 10; .22 INJECTION, SOLUTION INTRAVENOUS at 14:45

## 2024-04-29 RX ADMIN — SENNOSIDES AND DOCUSATE SODIUM 2 TABLET: 50; 8.6 TABLET ORAL at 18:29

## 2024-04-29 RX ADMIN — POTASSIUM CHLORIDE 20 MEQ: 1500 TABLET, EXTENDED RELEASE ORAL at 15:34

## 2024-04-29 RX ADMIN — DOXYCYCLINE 100 MG: 100 TABLET, FILM COATED ORAL at 18:29

## 2024-04-29 RX ADMIN — FUROSEMIDE 20 MG: 10 INJECTION INTRAMUSCULAR; INTRAVENOUS at 15:36

## 2024-04-29 ASSESSMENT — ENCOUNTER SYMPTOMS
WEIGHT LOSS: 0
DEPRESSION: 0
ABDOMINAL PAIN: 0
SHORTNESS OF BREATH: 1
VOMITING: 0
SPUTUM PRODUCTION: 1
NAUSEA: 0
MUSCULOSKELETAL NEGATIVE: 1
MYALGIAS: 0
COUGH: 1
GASTROINTESTINAL NEGATIVE: 1
PSYCHIATRIC NEGATIVE: 1
BRUISES/BLEEDS EASILY: 0
DIAPHORESIS: 0
CONSTITUTIONAL NEGATIVE: 1
EYES NEGATIVE: 1
PALPITATIONS: 0
BLURRED VISION: 0
WEAKNESS: 0
WEAKNESS: 1
FEVER: 0
NEUROLOGICAL NEGATIVE: 1
DIZZINESS: 0
SORE THROAT: 0
FOCAL WEAKNESS: 0
CHILLS: 0
FATIGUE: 1
SHORTNESS OF BREATH: 0
CHEST TIGHTNESS: 0
HEADACHES: 0
ORTHOPNEA: 1

## 2024-04-29 ASSESSMENT — CHA2DS2 SCORE
SEX: MALE
AGE 75 OR GREATER: NO
CHF OR LEFT VENTRICULAR DYSFUNCTION: YES
PRIOR STROKE OR TIA OR THROMBOEMBOLISM: NO
CHA2DS2 VASC SCORE: 3
VASCULAR DISEASE: NO
HYPERTENSION: YES
AGE 65 TO 74: YES
DIABETES: NO

## 2024-04-29 ASSESSMENT — FIBROSIS 4 INDEX: FIB4 SCORE: 1.14

## 2024-04-29 ASSESSMENT — LIFESTYLE VARIABLES: SUBSTANCE_ABUSE: 0

## 2024-04-29 NOTE — CARE PLAN
Problem: Knowledge Deficit - Standard  Goal: Patient and family/care givers will demonstrate understanding of plan of care, disease process/condition, diagnostic tests and medications  Outcome: Progressing     Problem: Fall Risk  Goal: Patient will remain free from falls  Outcome: Progressing     Problem: Skin Integrity  Goal: Skin integrity is maintained or improved  Outcome: Progressing     Problem: Pain - Standard  Goal: Alleviation of pain or a reduction in pain to the patient’s comfort goal  Outcome: Progressing   The patient is Stable - Low risk of patient condition declining or worsening    Shift Goals  Clinical Goals: Remain free of falls, no skin breakdown  Patient Goals: Rest  Family Goals: CARLY    Progress made toward(s) clinical / shift goals:     Pt educated on plan of care, pt verbalizes understanding, reinforcement needed. Pt educated on pain/anxiety scales, alleviating factors, pain/anxiety medications, and side effects, and need for pain/anxiety reassessment, pt pain/anxiety controlled at this time, reinforcement needed. Pt educated on the need to reposition frequently and remain dry to avoid skin breakdown, reinforcement needed, no further skin breakdown during shift. Fall risk education provided to pt, pt educated about the need to call for assistance while attempting to ambulate, reinforcement needed, pt remains free of falls this shift.

## 2024-04-29 NOTE — CONSULTS
"Cardiology Initial Consultation    Date of Service  4/29/2024    Referring Physician  Maci Garcia M.D.    Reason for Consultation  Abnormal echocardiogram    History of Presenting Illness  Ervin Ly is a 73 y.o. male with a past medical history of atrial fibrillation diagnosed 9/2023, DCCV ([facility]), Afib ablation 3/4/2024 ([facility], Todd Vilchis MD), GI bleed 2023 ([place], [place]) who presented 4/28/2024 with persistent productive cough, hypoxia, abnormal chest x-ray diagnosed with pneumonia and started on antibiotics.  Had a abnormal echocardiogram showing left ventricular ejection fraction of 45% mild aortic stenosis.  Cardiology consultation was requested.    The patient presents with 3 to 4 weeks of persistent productive cough bringing up \"green sludge junk\".  He came into the ER at the urging of his family members.  He is felt fatigued, tired with loss of stamina particularly in the last 3 to 4 days.  Denies specifically shortness of breath or anginal chest pain symptoms.  No PND, orthopnea or LE edema. Currently feels better with oxygen and antibiotics.    The patient reports that he was hospitalized at [facility] in 9/2023 with a new diagnosis of atrial fibrillation presenting with syncope suffering fractured ribs.  Underwent DCCV.  In [place] underwent Afib ablation 3/4/2024 placed on sotalol and continued on apixaban.  States prior to this he underwent a \"a bunch of tests\" including what he thinks was a \"chemical stress test\" done on 12/4/2023 which reportedly demonstrated \"a small fixed defect\" and LVEF of 33% with a contemporaneous echocardiogram showing LVEF of 50-55%.  Pre-Afib ablation chest CTA was abnormal for pulmonary nodules and inflammatory changes.    Prior to this in the summer 2023 the patient reports being hospitalized at [place], [place] for rectal bleeding.  Underwent colonoscopy and possibly had polyps removed but stated he " needed a follow-up which she had not done.  His PCP had placed him on iron that he ran out of approximately a week or 2 ago.    History of dyslipidemia on rosuvastatin.  Status post I-131 20 years ago on chronic thyroid supplementation.  No hypertension, diabetes mellitus, cigarette use, significant alcohol use or illicit drug use.  Denies COPD, DVT or pulmonary embolus.  Has been on bronchodilator therapy on medication list.    Review of Systems  Review of Systems   Constitutional:  Positive for fatigue.   Respiratory:  Positive for cough. Negative for chest tightness and shortness of breath.    Cardiovascular:  Negative for palpitations.   Gastrointestinal:  Negative for abdominal pain and nausea.   Neurological:  Positive for weakness. Negative for dizziness and headaches.   All other systems reviewed and are negative.      Past Medical History   has no past medical history on file.    Surgical History   has no past surgical history on file.    Family History  No family history of premature coronary disease    Social History   reports that he has never smoked. He has never been exposed to tobacco smoke. His smokeless tobacco use includes chew. He reports that he does not currently use alcohol. He reports that he does not use drugs.    Medications  Prior to Admission Medications   Prescriptions Last Dose Informant Patient Reported? Taking?   Cholecalciferol (VITAMIN D3) 50 MCG (2000 UT) Tab 4/28/2024 at AM  Yes Yes   Sig: Take 2,000 Units by mouth every day.   NEXIUM 40 MG delayed-release capsule 4/27/2024 at PM  Yes Yes   Sig: Take 40 mg by mouth every day.   albuterol (PROAIR HFA) 108 (90 Base) MCG/ACT Aero Soln inhalation aerosol 4/27/2024 at PM  Yes Yes   Sig: Inhale 1-2 Puffs every four hours as needed for Shortness of Breath.   allopurinol (ZYLOPRIM) 300 MG Tab 4/28/2024 at AM  Yes Yes   Sig: Take 1 tablet every day by oral route as directed for 90 days.   apixaban (ELIQUIS) 5mg Tab 4/28/2024 at AM  Yes Yes    Sig: Take 1 Tablet by mouth 2 times a day.   docusate sodium 100 MG Cap 4/27/2024 at PM  Yes Yes   Sig: Take 100 mg by mouth every day.   levothyroxine (SYNTHROID) 200 MCG Tab 4/28/2024 at AM  Yes Yes   Sig: Take 200 mcg by mouth every day.   metoprolol tartrate (LOPRESSOR) 25 MG Tab 4/28/2024 at AM  Yes Yes   Sig: Take 25 mg by mouth 2 times a day.   montelukast (SINGULAIR) 10 MG Tab 4/28/2024 at AM  Yes Yes   Sig: Take 10 mg by mouth every day.   rosuvastatin (CRESTOR) 10 MG Tab 4/27/2024 at PM  Yes Yes   Sig: Take 1 tablet every day by oral route in the evening for 90 days.   sotalol (BETAPACE) 80 MG Tab 4/27/2024 at PM  Yes Yes   Sig: Take 1 Tablet by mouth 2 times a day.      Facility-Administered Medications: None       Allergies  No Known Allergies    Vital signs in last 24 hours  Temp:  [36.7 °C (98 °F)-37.2 °C (99 °F)] 37.1 °C (98.8 °F)  Pulse:  [] 61  Resp:  [12-18] 18  BP: (116-137)/(50-82) 135/70  SpO2:  [90 %-95 %] 94 %    Physical Exam  Physical Exam  Constitutional:       General: He is not in acute distress.  HENT:      Head: Normocephalic.   Eyes:      General: No scleral icterus.     Conjunctiva/sclera: Conjunctivae normal.      Pupils: Pupils are equal, round, and reactive to light.   Neck:      Comments: Normal jugular venous pressure.  Cardiovascular:      Rate and Rhythm: Normal rate and regular rhythm.      Pulses:           Carotid pulses are 1+ on the right side and 1+ on the left side.       Radial pulses are 1+ on the right side and 1+ on the left side.        Posterior tibial pulses are 1+ on the right side and 1+ on the left side.      Heart sounds: S1 normal and S2 normal. Murmur heard.      No friction rub. No gallop.   Pulmonary:      Effort: Pulmonary effort is normal.      Breath sounds: Rales present. No wheezing or rhonchi.      Comments: Coarse breath sounds  Abdominal:      General: Bowel sounds are normal.      Palpations: Abdomen is soft.      Tenderness: There is no  abdominal tenderness.   Musculoskeletal:      Right lower leg: No edema.      Left lower leg: No edema.   Skin:     General: Skin is warm and dry.      Nails: There is no clubbing.   Neurological:      Mental Status: He is alert and oriented to person, place, and time.   Psychiatric:         Behavior: Behavior normal.         Lab Review  Lab Results   Component Value Date/Time    WBC 15.5 (H) 04/29/2024 01:17 PM    RBC 4.30 (L) 04/29/2024 01:17 PM    HEMOGLOBIN 10.3 (L) 04/29/2024 01:17 PM    HEMATOCRIT 33.0 (L) 04/29/2024 01:17 PM    MCV 76.7 (L) 04/29/2024 01:17 PM    MCH 24.0 (L) 04/29/2024 01:17 PM    MCHC 31.2 (L) 04/29/2024 01:17 PM    MPV 8.6 (L) 04/29/2024 01:17 PM      Lab Results   Component Value Date/Time    SODIUM 138 04/29/2024 01:17 PM    POTASSIUM 3.6 04/29/2024 01:17 PM    CHLORIDE 102 04/29/2024 01:17 PM    CO2 24 04/29/2024 01:17 PM    GLUCOSE 123 (H) 04/29/2024 01:17 PM    BUN 14 04/29/2024 01:17 PM    CREATININE 0.71 04/29/2024 01:17 PM      Lab Results   Component Value Date/Time    ASTSGOT 16 04/28/2024 01:21 PM    ALTSGPT 5 04/28/2024 01:21 PM     Lab Results   Component Value Date/Time    TROPONINT 25 (H) 04/29/2024 07:46 AM       Recent Labs     04/28/24  1321   NTPROBNP 2655*       Cardiac Imaging and Procedures Review  EKG:  My personal interpretation of the EKG dated 4/29/2024 is sinus rhythm, rate 69, QTc 533 ms    Echocardiogram 8/26/2023 ([facility])  Left ventricular ejection fraction 60-65%.  Left ventricular wall motion is normal  Right ventricular size and systolic function are normal.  No valvular abnormalities.    Echocardiogram 12/22/2023 ([facility])  Left ventricular ejection fraction 50-55%  Moderate concentric left ventricular hypertrophy.  Normal right ventricular systolic function  Aortic sclerosis, trace aortic regurgitation  Mild pulmonary hypertension    Echocardiogram: 4/29/2024  Mildly reduced left ventricular systolic function.  Mild  aortic valve stenosis.  Transvalvular gradients are - Peak: 22 mmHg, Mean: 13 mmHg.    Imaging  Chest X-Ray: 4/28/2024  1. There are bilateral perihilar and lower lobe parenchymal opacities which could be due to combination of edema and multifocal pneumonia.    Chest CT[facility] 2/23/2024 ([facility])  1.  Normal pulmonary venous anatomy.  Diffuse bronchial wall thickening with scattered multifocal subsolid groundglass pulmonary nodules throughout all lung fields which appear clustered/tree-in-bud configuration.  Findings favor infectious/inflammatory etiologies  Moderate cardiomegaly with severe coronary calcification and small pericardial effusion  Additional more prominent and suspicious appearing pulmonary nodules are seen measuring 10-11 mm in size.  Mildly prominent mediastinal and right hilar lymph nods.    MPI 12/4/2023 ([place])  Left ventricular ejection fraction 33%  Small fixed defect.    Assessment  Hypoxic respiratory failure  Pneumonia  Mild LV dysfunction LVEF 45%  Aortic stenosis, mild  Abnormal chest CTA suspicious for interstitial or inflammatory processes  PAF  Afib ablation 3/4/2024  Antiarrhythmic therapy with sotalol  Anticoagulation on apixaban  Coronary calcification  Anemia, microcytic  H/O GI bleed 2023  Hypothyroidism, S/P I-131  Dyslipidemia    Recommendation Discussion  Clinically appears stable with no obvious findings or symptoms of ischemia or volume overload.  Agree with trial of IV Lasix to see if this improves O2 requirements  Needs predischarge ischemic workup would initially proceed with MPI  Continue sotalol  On apixaban  Needs anemia workup  Based on prior chest CTA and clinical presentation of productive cough needs pulmonary evaluation    Thank you for allowing me to participate in the care of this patient.    Please contact me with any questions.    Claude Billings M.D.   Cardiologist, [facility]  [phone]

## 2024-04-29 NOTE — PROGRESS NOTES
Hospital Medicine Daily Progress Note    Date of Service  4/29/2024    Chief Complaint  Ervin yL is a 73 y.o. male admitted 4/28/2024 with shortness of breath     Hospital Course  Ervin Ly is a 73 y.o. male with h/o chronic afib on Eliquis, prior ablation, HTN, HLD, gout, GERD, new diagnosis of colon cancer (that has not yet been treated) and reactive airway disease. He presented to [facility] on 4/28/2024 due to shortness of breath Patient reported not feeling well for the past 3 weeks, endorsed subjective fever, chills, productive sputum, weakness. He reported traveling to [place] recently, denies sick contact. He has been living in his  and was planning to drive from [facility] to [place]. In [facility] at the  park he became very weak and dizzy, he require assistance getting into his car and then proceed to drive while he was symptomatic to [place]. Here, he was seen at urgent care due to concern of multifocal pneumonia on CXR and hypoxia. In the ER, he is requiring 2-4L NC. He received Unasyn and doxycyline in ER.     Interval Problem Update  Axox3, reports sob and rodriguez have improved, does have a productive cough, denies cp, no dizziness. 94% on 4L. EF reduced, patient denies previous history of CHF. ROS otherwise negative, I discussed his case with Cardiology and they will eval.   I have discussed this patient's plan of care and discharge plan at IDT rounds today with Case Management, Nursing, Nursing leadership, and other members of the IDT team.    Consultants/Specialty  Cardiology [facility]    Code Status  Full Code    Disposition  The patient is not medically cleared for discharge to home or a post-acute facility.      I have placed the appropriate orders for post-discharge needs.    Review of Systems  Review of Systems   Constitutional: Negative.  Negative for chills, diaphoresis, fever, malaise/fatigue and weight loss.   HENT: Negative.  Negative for sore throat.    Eyes: Negative.  Negative for blurred  vision.   Respiratory:  Positive for cough, sputum production and shortness of breath.    Cardiovascular:  Positive for orthopnea. Negative for chest pain, palpitations and leg swelling.   Gastrointestinal: Negative.  Negative for abdominal pain, nausea and vomiting.   Genitourinary: Negative.  Negative for dysuria.   Musculoskeletal: Negative.  Negative for myalgias.   Skin: Negative.  Negative for itching and rash.   Neurological: Negative.  Negative for dizziness, focal weakness, weakness and headaches.   Endo/Heme/Allergies: Negative.  Does not bruise/bleed easily.   Psychiatric/Behavioral: Negative.  Negative for depression, substance abuse and suicidal ideas.    All other systems reviewed and are negative.       Physical Exam  Temp:  [36.7 °C (98 °F)-37.2 °C (99 °F)] 37.1 °C (98.8 °F)  Pulse:  [] 61  Resp:  [12-18] 18  BP: (116-137)/(50-82) 135/70  SpO2:  [90 %-95 %] 94 %    Physical Exam  Vitals and nursing note reviewed. Exam conducted with a chaperone present.   Constitutional:       General: He is not in acute distress.     Appearance: Normal appearance. He is not diaphoretic.   HENT:      Head: Normocephalic.      Nose: Nose normal.      Mouth/Throat:      Mouth: Mucous membranes are moist.   Eyes:      Pupils: Pupils are equal, round, and reactive to light.   Cardiovascular:      Rate and Rhythm: Normal rate and regular rhythm.      Pulses: Normal pulses.      Heart sounds: Normal heart sounds.   Pulmonary:      Effort: Pulmonary effort is normal.      Breath sounds: Rales present.   Abdominal:      General: Abdomen is flat. Bowel sounds are normal.      Palpations: Abdomen is soft.   Musculoskeletal:         General: No swelling or deformity. Normal range of motion.   Skin:     General: Skin is warm and dry.      Capillary Refill: Capillary refill takes less than 2 seconds.   Neurological:      General: No focal deficit present.      Mental Status: He is alert and oriented to person, place, and  time.      Cranial Nerves: No cranial nerve deficit.   Psychiatric:         Mood and Affect: Mood normal.         Behavior: Behavior normal.         Fluids    Intake/Output Summary (Last 24 hours) at 4/29/2024 1514  Last data filed at 4/29/2024 0800  Gross per 24 hour   Intake 320 ml   Output 300 ml   Net 20 ml       Laboratory  Recent Labs     04/28/24  1321 04/29/24  1317   WBC 18.4* 15.5*   RBC 4.80 4.30*   HEMOGLOBIN 11.4* 10.3*   HEMATOCRIT 36.2* 33.0*   MCV 75.4* 76.7*   MCH 23.8* 24.0*   MCHC 31.5* 31.2*   RDW 51.4* 52.6*   PLATELETCT 445 348   MPV 8.7* 8.6*     Recent Labs     04/28/24  1321 04/29/24  1317   SODIUM 134* 138   POTASSIUM 3.3* 3.6   CHLORIDE 99 102   CO2 21 24   GLUCOSE 94 123*   BUN 18 14   CREATININE 0.87 0.71   CALCIUM 8.8 8.7     Recent Labs     04/28/24  1321   INR 1.98*               Imaging  EC-ECHOCARDIOGRAM COMPLETE W/ CONT   Final Result           Assessment/Plan  * Multifocal pneumonia- (present on admission)  Assessment & Plan  concern of bilateral infiltrates on CXR, requiring 4 L nasal cannula support  Culture results pending   continue Unasyn, doxycycline  Leukocytosis improving  Continue to follow  Cbc in am     QT prolongation  Assessment & Plan  On sotolol  Following on tele  Cardiology to eval   Avoid prolonging agents    CHF (congestive heart failure) (HCC)  Assessment & Plan  Suspect acute systolic chf  EF reduced at 45%  Will start diuresis  Cardiology to eval     ACP (advance care planning)  Assessment & Plan  Goal of care discussed with patient and patient's wife in ER. Patient is agreeable noninvasive as well as invasive/heroic life-sustaining measures--including CPR/defibrillation/intubation or mechanical ventilation if needed. He is also agreeable for IV antibiotic, breathing treatment as needed, any further medical management as clinically warranted. Diagnosis, prognosis, questions and concerns addressed. FULL code status confirmed with patient. ACP:  16minutes.    Elevated troponin  Assessment & Plan  Probable demand ischemia, minimally elevated, trops flat at 25  Continue tele  Continue Eliquis, statin      Reactive airway disease  Assessment & Plan  Continue home regimen  Currently not in acute exacerbation    Hypertension  Assessment & Plan  Home meds as tolerated    Hypothyroidism  Assessment & Plan  Synthroid    Hyperlipidemia  Assessment & Plan  Rosuvastatin    Leukocytosis  Assessment & Plan  IVF, antibiotic  Improving  Cbc in am    Chronic a-fib (HCC)  Assessment & Plan  Continue Eliquis, sotalol    Hypokalemia  Assessment & Plan  Likely due to HCTZ use  Replaced  Mag is low normal so will replace this     Acute respiratory failure with hypoxia (HCC)  Assessment & Plan  Likely multifactorial related to pneumonia and acute chf  Titrate down as tolerated  Continue abx  Will start diuresis  Continue to follow  Bmp in am          VTE prophylaxis: VTE Selection    I have performed a physical exam and reviewed and updated ROS and Plan today (4/29/2024). In review of yesterday's note (4/28/2024), there are no changes except as documented above.

## 2024-04-29 NOTE — ED NOTES
Telephone report given to LOLA Forbes. RN will present shortly to retrieve patient. Patient remains on 2L VSS. AOX4 and aware of the POC.

## 2024-04-29 NOTE — PROGRESS NOTES
Report received. Assumed care  of pt. A/O x4. VSS. Responds appropriately. Denies pain, SOB. A Explained importance of calling before getting OOB. Call light and belongings within reach.

## 2024-04-29 NOTE — DIETARY
"Nutrition services: Day 1 of admit.  Ervin Ly is a 73 y.o. male with admitting DX of multifocal pneumonia.    Consult received for MST score of 5 for reported unintentional wt loss of 34lb+ x 1 month and decreased appetite.    Met w/ pt at bedside. Pt reported a ~30 lb wt loss over the last few months r/t pneumonia, starting with symptoms of PND, sore throat, and cough following ablation done 3/4/24. Pt reports that he has been producing thick green secretions from his lungs and swallowing a lot of it, making him lose his appetite, which he reported was exacerbated in the last 3-4 weeks. Pt however reported feeling hungry upon waking up this morning.    Reviewed food preferences and pt was willing to try oral nutrition supplements while admitted.    Conducted nutrition focused PE, pt with moderate muscle wasting noted in the slight depression of the temples and protrusion of the clavicles and acromion processes.     Assessment:  Height: 188 cm (6' 2\")  Weight: 106 kg (232 lb 12.9 oz) taken via stand up scale 4/29  Body mass index is 29.89 kg/m². BMI classification: overweight  Diet/Intake: Cardiac / no PO intake yet recorded in ADLs    Evaluation:   Pt presented to ED w/ c/o SOB, sent from urgent care for bilateral lobe pneumonia reading.   PMHx includes HTN, HLD, GERD, chronic afib, reactive airway disease.  Per chart review, pt has lost 39 lb (14% body weight) x 4 months and 26 lb (10%) x 6 weeks, both of which are severe.  4/29/24 106 kg (232 lb 12.9 oz)   4/5/24 112 kg (247 lb)   3/19/24 117.2 kg (258 lb 6.4 oz)   3/7/24 123.4 kg (272 lb)   3/4/24 124.7 kg (275 lb)   12/14/23 123.1 kg (271 lb 6.4)   10/27/23 120.8 kg (266 lb 6.4 oz)   3/10/22 136.1 kg (300 lb)   Labs (4/28): sodium 134, potassium 3.3  Meds: Prilosec, Crestor, vitamin D3, BM protocol  Last BM: PTA    Malnutrition Risk: Pt meets ASPEN criteria for severe malnutrition in the context of acute illness r/t pneumonia as evidenced by severe wt " loss of 10% x 6 weeks and inadequate energy intake of <50% of estimated needs for >5 days.     Recommendations/Plan:  +Ensure compact BID   Encourage intake of >50% of meals and supplements  Document intake of all meals and supplements as % taken in ADL's to provide interdisciplinary communication across all shifts.   Monitor weight.  Nutrition rep will continue to see patient for ongoing meal and snack preferences.       RD following

## 2024-04-29 NOTE — PROGRESS NOTES
Bedside report received from off going RN/tech: Leidy, assumed care of patient.     Fall Risk Score: LOW RISK  Fall risk interventions in place: Provide patient/family education based on risk assessment, Educate patient/family to call staff for assistance when getting out of bed, Place fall precaution signage outside patient door, Utilize bed/chair fall alarm, and Bed alarm connected correctly  Bed type: Regular (Sam Score less than 17 interventions in place)  Patient on cardiac monitor: Yes  IVF/IV medications: Infusion per MAR (List Med(s)) Unasyn, LR  Oxygen: How many liters 4L  Bedside sitter: Not Applicable   Isolation: Not applicable

## 2024-04-29 NOTE — ASSESSMENT & PLAN NOTE
- New.  No known history.  Initially volume overloaded, but now appears euvolemic.  -Cardiology following.  Holding Lasix for now.  -Discussed with cardiology, proceed with cardiac ischemic workup with nuclear cardiac stress test to begin with.  -Will need to optimize GDMT.  -Monitor on telemetry.

## 2024-04-29 NOTE — PROGRESS NOTES
Monitor Summary  Rhythm: Normal Sinus Rhythm  Rate: 71-92  Ectopy: PVCs, PACs  .15 / .08 / .38

## 2024-04-29 NOTE — PROGRESS NOTES
Report received from night RN at 0700. PT seen at bedside and pt care assumed. Pt is A&Ox4, on 4L NC, denies pain. PIV flushed and intact. PT is SR on telemetry monitor. PT is up standby.     Plan of care reviewed with pt, call light, phone, and personal belongings within reach. Bed alarm on, and bed in low locked position. All pt's needs met at this time.    Bedside report received from off going RN/tech: Nelson RN, assumed care of patient.     Fall Risk Score: LOW RISK  Fall risk interventions in place: Place yellow fall risk ID band on patient, Provide patient/family education based on risk assessment, Educate patient/family to call staff for assistance when getting out of bed, Place fall precaution signage outside patient door, Place patient in room close to nursing station, Utilize bed/chair fall alarm, and Bed alarm connected correctly  Bed type: Regular (Sam Score less than 17 interventions in place)  Patient on cardiac monitor: Yes  IVF/IV medications: Not Applicable   Oxygen: How many liters 4L  Bedside sitter: Not Applicable   Isolation: Not applicable

## 2024-04-29 NOTE — PROGRESS NOTES
4 Eyes Skin Assessment Completed by LOLA Forbes and YASMEEN Carlson.    Head WDL  Ears WDL  Nose WDL  Mouth WDL  Neck WDL  Breast/Chest WDL  Shoulder Blades WDL  Spine WDL  (R) Arm/Elbow/Hand WDL  (L) Arm/Elbow/Hand WDL  Abdomen WDL  Groin WDL  Scrotum/Coccyx/Buttocks WDL  (R) Leg Minor edema  (L) Leg Minor edema  (R) Heel/Foot/Toe Boggy  (L) Heel/Foot/Toe Boggy          Devices In Places Tele Box, Pulse Ox, and Nasal Cannula      Interventions In Place N/A    Possible Skin Injury No    Pictures Uploaded Into Epic N/A  Wound Consult Placed N/A  RN Wound Prevention Protocol Ordered No

## 2024-04-29 NOTE — ED NOTES
RN at bedside for transport. All belongings with patient for transport. Family remains with patient.

## 2024-04-30 ENCOUNTER — APPOINTMENT (OUTPATIENT)
Dept: RADIOLOGY | Facility: MEDICAL CENTER | Age: 74
DRG: 981 | End: 2024-04-30
Attending: INTERNAL MEDICINE
Payer: MEDICARE

## 2024-04-30 VITALS
SYSTOLIC BLOOD PRESSURE: 126 MMHG | HEART RATE: 67 BPM | HEIGHT: 74 IN | DIASTOLIC BLOOD PRESSURE: 71 MMHG | WEIGHT: 230.38 LBS | BODY MASS INDEX: 29.57 KG/M2 | RESPIRATION RATE: 18 BRPM | TEMPERATURE: 98.8 F | OXYGEN SATURATION: 92 %

## 2024-04-30 PROBLEM — D68.318 ACQUIRED CIRCULATING ANTICOAGULANTS (HCC): Status: ACTIVE | Noted: 2024-04-30

## 2024-04-30 PROBLEM — I50.21 ACUTE HFREF (HEART FAILURE WITH REDUCED EJECTION FRACTION) (HCC): Status: ACTIVE | Noted: 2024-04-29

## 2024-04-30 PROBLEM — I50.20 SYSTOLIC CONGESTIVE HEART FAILURE (HCC): Status: ACTIVE | Noted: 2024-04-29

## 2024-04-30 LAB
ANION GAP SERPL CALC-SCNC: 11 MMOL/L (ref 7–16)
BACTERIA SPEC RESP CULT: ABNORMAL
BUN SERPL-MCNC: 11 MG/DL (ref 8–22)
CALCIUM SERPL-MCNC: 8.3 MG/DL (ref 8.5–10.5)
CHLORIDE SERPL-SCNC: 101 MMOL/L (ref 96–112)
CHOLEST SERPL-MCNC: 75 MG/DL (ref 100–199)
CO2 SERPL-SCNC: 25 MMOL/L (ref 20–33)
CREAT SERPL-MCNC: 0.59 MG/DL (ref 0.5–1.4)
EKG IMPRESSION: NORMAL
GFR SERPLBLD CREATININE-BSD FMLA CKD-EPI: 103 ML/MIN/1.73 M 2
GLUCOSE SERPL-MCNC: 112 MG/DL (ref 65–99)
GRAM STN SPEC: ABNORMAL
HDLC SERPL-MCNC: 26 MG/DL
LDLC SERPL CALC-MCNC: 36 MG/DL
MAGNESIUM SERPL-MCNC: 1.9 MG/DL (ref 1.5–2.5)
POTASSIUM SERPL-SCNC: 3.3 MMOL/L (ref 3.6–5.5)
SIGNIFICANT IND 70042: ABNORMAL
SITE SITE: ABNORMAL
SODIUM SERPL-SCNC: 137 MMOL/L (ref 135–145)
SOURCE SOURCE: ABNORMAL
TRIGL SERPL-MCNC: 67 MG/DL (ref 0–149)

## 2024-04-30 PROCEDURE — 700105 HCHG RX REV CODE 258: Performed by: STUDENT IN AN ORGANIZED HEALTH CARE EDUCATION/TRAINING PROGRAM

## 2024-04-30 PROCEDURE — 99233 SBSQ HOSP IP/OBS HIGH 50: CPT | Performed by: INTERNAL MEDICINE

## 2024-04-30 PROCEDURE — A9270 NON-COVERED ITEM OR SERVICE: HCPCS | Performed by: STUDENT IN AN ORGANIZED HEALTH CARE EDUCATION/TRAINING PROGRAM

## 2024-04-30 PROCEDURE — 700111 HCHG RX REV CODE 636 W/ 250 OVERRIDE (IP): Performed by: HOSPITALIST

## 2024-04-30 PROCEDURE — 770020 HCHG ROOM/CARE - TELE (206)

## 2024-04-30 PROCEDURE — 700102 HCHG RX REV CODE 250 W/ 637 OVERRIDE(OP): Mod: JZ

## 2024-04-30 PROCEDURE — 700111 HCHG RX REV CODE 636 W/ 250 OVERRIDE (IP): Mod: JZ | Performed by: STUDENT IN AN ORGANIZED HEALTH CARE EDUCATION/TRAINING PROGRAM

## 2024-04-30 PROCEDURE — A9270 NON-COVERED ITEM OR SERVICE: HCPCS | Mod: JZ

## 2024-04-30 PROCEDURE — 700102 HCHG RX REV CODE 250 W/ 637 OVERRIDE(OP): Performed by: STUDENT IN AN ORGANIZED HEALTH CARE EDUCATION/TRAINING PROGRAM

## 2024-04-30 PROCEDURE — 700111 HCHG RX REV CODE 636 W/ 250 OVERRIDE (IP)

## 2024-04-30 PROCEDURE — 93005 ELECTROCARDIOGRAM TRACING: CPT | Performed by: INTERNAL MEDICINE

## 2024-04-30 RX ORDER — REGADENOSON 0.08 MG/ML
INJECTION, SOLUTION INTRAVENOUS
Status: COMPLETED
Start: 2024-04-30 | End: 2024-04-30

## 2024-04-30 RX ORDER — POTASSIUM CHLORIDE 20 MEQ/1
40 TABLET, EXTENDED RELEASE ORAL ONCE
Status: COMPLETED | OUTPATIENT
Start: 2024-04-30 | End: 2024-04-30

## 2024-04-30 RX ADMIN — ROSUVASTATIN 10 MG: 10 TABLET, FILM COATED ORAL at 17:56

## 2024-04-30 RX ADMIN — FUROSEMIDE 20 MG: 10 INJECTION INTRAMUSCULAR; INTRAVENOUS at 05:46

## 2024-04-30 RX ADMIN — AMPICILLIN AND SULBACTAM 3 G: 1; 2 INJECTION, POWDER, FOR SOLUTION INTRAMUSCULAR; INTRAVENOUS at 00:34

## 2024-04-30 RX ADMIN — SENNOSIDES AND DOCUSATE SODIUM 2 TABLET: 50; 8.6 TABLET ORAL at 17:56

## 2024-04-30 RX ADMIN — APIXABAN 5 MG: 5 TABLET, FILM COATED ORAL at 05:45

## 2024-04-30 RX ADMIN — OMEPRAZOLE 20 MG: 20 CAPSULE, DELAYED RELEASE ORAL at 17:56

## 2024-04-30 RX ADMIN — POTASSIUM CHLORIDE 40 MEQ: 1500 TABLET, EXTENDED RELEASE ORAL at 05:43

## 2024-04-30 RX ADMIN — SOTALOL HYDROCHLORIDE 80 MG: 80 TABLET ORAL at 17:56

## 2024-04-30 RX ADMIN — DOXYCYCLINE 100 MG: 100 TABLET, FILM COATED ORAL at 05:46

## 2024-04-30 RX ADMIN — MONTELUKAST 10 MG: 10 TABLET, FILM COATED ORAL at 05:43

## 2024-04-30 RX ADMIN — ALLOPURINOL 300 MG: 300 TABLET ORAL at 05:43

## 2024-04-30 RX ADMIN — AMPICILLIN AND SULBACTAM 3 G: 1; 2 INJECTION, POWDER, FOR SOLUTION INTRAMUSCULAR; INTRAVENOUS at 05:50

## 2024-04-30 RX ADMIN — DOXYCYCLINE 100 MG: 100 TABLET, FILM COATED ORAL at 17:56

## 2024-04-30 RX ADMIN — OMEPRAZOLE 20 MG: 20 CAPSULE, DELAYED RELEASE ORAL at 05:45

## 2024-04-30 RX ADMIN — LEVOTHYROXINE SODIUM 200 MCG: 0.1 TABLET ORAL at 05:44

## 2024-04-30 RX ADMIN — REGADENOSON 0.4 MG: 0.08 INJECTION, SOLUTION INTRAVENOUS at 15:26

## 2024-04-30 RX ADMIN — AMPICILLIN AND SULBACTAM 3 G: 1; 2 INJECTION, POWDER, FOR SOLUTION INTRAMUSCULAR; INTRAVENOUS at 17:59

## 2024-04-30 RX ADMIN — SOTALOL HYDROCHLORIDE 80 MG: 80 TABLET ORAL at 05:45

## 2024-04-30 RX ADMIN — Medication 2000 UNITS: at 05:45

## 2024-04-30 ASSESSMENT — ENCOUNTER SYMPTOMS
HEADACHES: 0
PALPITATIONS: 0
ABDOMINAL PAIN: 0
DIZZINESS: 0
NAUSEA: 0
SHORTNESS OF BREATH: 0
CHEST TIGHTNESS: 0

## 2024-04-30 ASSESSMENT — PAIN DESCRIPTION - PAIN TYPE: TYPE: ACUTE PAIN

## 2024-04-30 ASSESSMENT — FIBROSIS 4 INDEX: FIB4 SCORE: 1.46

## 2024-04-30 NOTE — PROGRESS NOTES
Monitor Summary  Rhythm: Normal Sinus Rhythm  Rate: 60-78  Ectopy: PVCs, PACs, Couplets  .17 / .07 / .43

## 2024-04-30 NOTE — PROGRESS NOTES
Bedside report received from off going RN/tech: Mike, assumed care of patient.     Fall Risk Score: LOW RISK  Fall risk interventions in place: Provide patient/family education based on risk assessment, Educate patient/family to call staff for assistance when getting out of bed, and Place fall precaution signage outside patient door  Bed type: Regular (Sam Score less than 17 interventions in place)  Patient on cardiac monitor: Yes  IVF/IV medications: Infusion per MAR (List Med(s)) Unasyn  Oxygen: How many liters 4L  Bedside sitter: Not Applicable   Isolation: Not applicable

## 2024-04-30 NOTE — PROGRESS NOTES
Bedside report received from off going RN/tech: Chris, assumed care of patient@[id number].     Fall Risk Score: LOW RISK  Fall risk interventions in place: Place yellow fall risk ID band on patient, Provide patient/family education based on risk assessment, Educate patient/family to call staff for assistance when getting out of bed, and Place fall precaution signage outside patient door  Bed type: Regular (Sam Score less than 17 interventions in place)  Patient on cardiac monitor: Yes  IVF/IV medications: Not Applicable   Oxygen: How many liters 4L  Bedside sitter: Not Applicable   Isolation: Not applicable

## 2024-04-30 NOTE — PROGRESS NOTES
[facility] Medicine Daily Progress Note    Date of Service  4/30/2024    Chief Complaint  shortness of breath     Hospital Course  Ervin Ly is a 73 y.o. male with chronic atrial fibrillation on Eliquis and sotalol with history of prior ablation, HTN, HLD, gout, GERD, new diagnosis of colon cancer (that has not yet been treated) and reactive airway disease, who presented to [facility] on 4/28/2024 due to shortness of breath and not  feeling well for the past 3 weeks with subjective fever, chills, productive sputum, and weakness. He reported traveling to [place] recently, but denied sick contact. He has been living in his  and was planning to drive from [place] to [place]. In [place] at the  park he became very weak and dizzy, he require assistance getting into his car and then proceeded to drive while he was symptomatic to [place]. Here, he was seen at urgent care due to concern of multifocal pneumonia on CXR and was hypoxic and he was sent to the ED.  On evaluation, he was requiring 2 to 4 L of oxygen by nasal cannula.  WBC was 18,400.  Sodium 134, potassium 3.3, BNP of 2655, and troponin was 25.  Chest x-ray showed bilateral perihilar and lower lobe parenchymal opacities.  Patient started on antibiotics.  Echocardiogram was obtained which showed reduced EF of 45%, with no prior history of CHF.  He was started on diuresis, and cardiology was consulted.    Interval Problem Update  4/30/2024 - I reviewed the patient's chart. There were no significant overnight events. Remains hemodynamically stable and afebrile.  Remains on 4 L of oxygen by nasal cannula.  Maintained sinus rhythm.  He had good diuresis, 1650 cc of urine output, with negative fluid balance.  Potassium 3.3.  Creatinine remained normal.  Magnesium is normal.  LDL 36, total cholesterol 75, HDL 26 and triglyceride 267.  Stool guaiac was negative.  Blood cultures remain negative.  Sputum cultures growing MSSA.  I personally discussed with cardiology who  recommended ischemic workup with MPI.    > I have personally seen and examined the patient today.  He states he is breathing better.  Has occasional cough productive of greenish phlegm.  No leg edema.  No chest pains.  No fevers or chills.  No bowel movement changes.  No abdominal pain.    I personally reviewed all lab results mentioned above. Prior medical records from this institution and outside facilities were independently reviewed as noted. I also personally reviewed all ER physician and consultant recommendations and plans as documented above. History was independently obtained by myself. I have discussed this patient's plan of care and discharge plan at IDT rounds today with Case Management, Nursing, Nursing leadership, and other members of the IDT team.      Consultants/Specialty  Cardiology [facility]    Code Status  Full Code    Disposition  The patient is not medically cleared for discharge to home or a post-acute facility.      6 clicks 24 and 24.  Anticipate discharge to home once medically cleared.  I have placed the appropriate orders for post-discharge needs.    Review of Systems  ROS     Pertinent positives/negatives as mentioned above.     A complete review of systems was personally done by me. All other systems were negative.       Physical Exam  Temp:  [36.6 °C (97.9 °F)-37.4 °C (99.3 °F)] 37 °C (98.6 °F)  Pulse:  [59-71] 59  Resp:  [18] 18  BP: ()/(52-70) 121/69  SpO2:  [92 %-97 %] 93 %    Physical Exam  Vitals and nursing note reviewed. Exam conducted with a chaperone present.   Constitutional:       General: He is not in acute distress.     Appearance: Normal appearance. He is not toxic-appearing or diaphoretic.   HENT:      Head: Normocephalic and atraumatic.      Right Ear: External ear normal.      Left Ear: External ear normal.      Nose: Nose normal.      Mouth/Throat:      Mouth: Mucous membranes are moist.      Pharynx: No oropharyngeal exudate.   Eyes:      General: No scleral  icterus.     Extraocular Movements: Extraocular movements intact.      Conjunctiva/sclera: Conjunctivae normal.      Pupils: Pupils are equal, round, and reactive to light.   Cardiovascular:      Rate and Rhythm: Normal rate and regular rhythm.      Pulses: Normal pulses.      Heart sounds: Normal heart sounds. No murmur heard.     No gallop.   Pulmonary:      Effort: Pulmonary effort is normal. No respiratory distress.      Breath sounds: No stridor. No wheezing, rhonchi or rales.      Comments: Diminished air entry left base, (+) occasional crackles  Chest:      Chest wall: No tenderness.   Abdominal:      General: Abdomen is flat. Bowel sounds are normal. There is no distension.      Palpations: Abdomen is soft. There is no mass.      Tenderness: There is no abdominal tenderness. There is no guarding or rebound.   Musculoskeletal:         General: No swelling or deformity. Normal range of motion.      Cervical back: Normal range of motion and neck supple.      Right lower leg: No edema.      Left lower leg: No edema.   Lymphadenopathy:      Cervical: No cervical adenopathy.   Skin:     General: Skin is warm and dry.      Capillary Refill: Capillary refill takes less than 2 seconds.      Coloration: Skin is not jaundiced.      Findings: No rash.   Neurological:      General: No focal deficit present.      Mental Status: He is alert and oriented to person, place, and time.      Cranial Nerves: No cranial nerve deficit.   Psychiatric:         Mood and Affect: Mood normal.         Behavior: Behavior normal.         Thought Content: Thought content normal.         Judgment: Judgment normal.         Fluids    Intake/Output Summary (Last 24 hours) at 4/30/2024 1422  Last data filed at 4/30/2024 0600  Gross per 24 hour   Intake 240 ml   Output 1650 ml   Net -1410 ml       Laboratory  Recent Labs     04/28/24  1321 04/29/24  1317   WBC 18.4* 15.5*  15.5*   RBC 4.80 4.30*  4.30*   HEMOGLOBIN 11.4* 10.3*  10.3*    HEMATOCRIT 36.2* 33.0*  33.0*   MCV 75.4* 76.7*  76.7*   MCH 23.8* 24.0*  24.0*   MCHC 31.5* 31.2*  31.2*   RDW 51.4* 52.6*  52.6*   PLATELETCT 445 348  348   MPV 8.7* 8.6*  8.6*     Recent Labs     04/28/24  1321 04/29/24  1317 04/30/24  0348   SODIUM 134* 138 137   POTASSIUM 3.3* 3.6 3.3*   CHLORIDE 99 102 101   CO2 21 24 25   GLUCOSE 94 123* 112*   BUN 18 14 11   CREATININE 0.87 0.71 0.59   CALCIUM 8.8 8.7 8.3*     Recent Labs     04/28/24  1321   INR 1.98*         Recent Labs     04/30/24  0348   TRIGLYCERIDE 67   HDL 26*   LDL 36       Imaging  EC-ECHOCARDIOGRAM COMPLETE W/ CONT   Final Result      NM-CARDIAC STRESS TEST    (Results Pending)        Assessment/Plan  * Multifocal pneumonia- (present on admission)  Assessment & Plan  - Chest x-ray showed bilateral perihilar and lower lobe parenchymal opacities.  Still requiring supplemental oxygen.  Sputum cultures growing MSSA.  -Continue antibiotics with IV Unasyn and doxycycline.  -Continue respiratory support with RT protocol, supplemental oxygen.  Keep saturations above 88%, wean as able from O2.   -Trend WBC count and procalcitonin.    Acute HFrEF (heart failure with reduced ejection fraction) (HCC)- (present on admission)  Assessment & Plan  - New.  No known history.  Initially volume overloaded, but now appears euvolemic.  -Cardiology following.  Holding Lasix for now.  -Discussed with cardiology, proceed with cardiac ischemic workup with nuclear cardiac stress test to begin with.  -Will need to optimize GDMT.  -Monitor on telemetry.      Acute respiratory failure with hypoxia (HCC)- (present on admission)  Assessment & Plan  -Multifactorial from pneumonia and HFrEF.  -Continue antibiotics and diuresis.  -Further cardiac ischemic workup as above.  -Continue respiratory support with RT protocol.  Continue supplemental oxygen, keep saturation above 88% and wean as able.    QT prolongation- (present on admission)  Assessment & Plan  -On sotalol.   Repeat EKG.  -Continue to monitor on telemetry.  -Avoid QTc prolonging medications.    Elevated troponin- (present on admission)  Assessment & Plan  -Probable demand ischemia, minimally elevated and has remained flat.   -Further ischemic workup with MPI as above.  -Continue statin and Eliquis.    Hypokalemia- (present on admission)  Assessment & Plan  - Replace with 40 mEq of oral K-Dur.  Magnesium level was normal.  -Continue to monitor, BMP in the morning.    Acquired circulating anticoagulants (HCC)- (present on admission)  Assessment & Plan  - Holding Eliquis for now in case he will need [facility].    ACP (advance care planning)- (present on admission)  Assessment & Plan  -Goals of care discussion this admission, patient wishes to remain full code.    Reactive airway disease- (present on admission)  Assessment & Plan  - At an acute exacerbation.  Continue as needed bronchodilators, along with home Singulair.  -Continue RT protocol.    Hypertension- (present on admission)  Assessment & Plan  -Maintaining good blood pressure control.  Continue sotalol. Monitor blood pressure trend closely, continue as needed IV labetalol for significant symptomatic hypertension.  Optimize blood pressure control.    Hypothyroidism- (present on admission)  Assessment & Plan  -Continue Synthroid.      Hyperlipidemia- (present on admission)  Assessment & Plan  -Continue statin.    Chronic a-fib (HCC)- (present on admission)  Assessment & Plan  - Maintaining rate control.  Continue sotalol.  Continue anticoagulation with Eliquis.  Continue to monitor on telemetry.         VTE prophylaxis: Eliquis    My total time spent caring for the patient on the day of the encounter was 52 minutes. This does not include time spent on separately billable procedures/tests.

## 2024-04-30 NOTE — PROGRESS NOTES
Cardiology Follow Up Progress Note    Date of Service  4/30/2024    Attending Physician  William Ott M.D.    NAHID Mueller Dontrell Ly is a 73 y.o. male admitted 4/28/2024 with a past medical history of atrial fibrillation diagnosed 9/2023, DCCV ([facility]), Afib ablation 3/4/2024 ([facility] Todd Vilchis MD), GI bleed 2023 ([place], [place]) who presented 4/28/2024 with persistent productive cough, hypoxia, abnormal chest x-ray diagnosed with pneumonia and started on antibiotics.  Had a abnormal echocardiogram showing left ventricular ejection fraction of 45% mild aortic stenosis.  Cardiology consultation was requested.    Interim Events  4/30: /69.  HR 68.  Sinus.  UO 1600 cc with IV furosemide.  Persistent intermittent cough.  No shortness of breath or chest pain.    Review of Systems  Review of Systems   Respiratory:  Negative for chest tightness and shortness of breath.    Cardiovascular:  Negative for palpitations.   Gastrointestinal:  Negative for abdominal pain and nausea.   Neurological:  Negative for dizziness and headaches.       Vital signs in last 24 hours  Temp:  [36.6 °C (97.9 °F)-37.4 °C (99.3 °F)] 36.6 °C (97.9 °F)  Pulse:  [61-71] 61  Resp:  [18] 18  BP: ()/(52-70) 93/63  SpO2:  [92 %-97 %] 93 %    Physical Exam  Physical Exam  Constitutional:       General: He is not in acute distress.  HENT:      Head: Normocephalic.   Neck:      Comments: Normal JVP  Cardiovascular:      Rate and Rhythm: Normal rate and regular rhythm.      Heart sounds: S1 normal and S2 normal. Murmur heard.      No friction rub. No gallop.   Pulmonary:      Effort: Pulmonary effort is normal.      Breath sounds: Normal breath sounds. No wheezing, rhonchi or rales.   Musculoskeletal:      Right lower leg: No edema.      Left lower leg: No edema.   Skin:     General: Skin is warm and dry.   Neurological:      Mental Status: He is alert and oriented to person, place, and time.    Psychiatric:         Behavior: Behavior normal.         Lab Review  Lab Results   Component Value Date/Time    WBC 15.5 (H) 04/29/2024 01:17 PM    WBC 15.5 (H) 04/29/2024 01:17 PM    RBC 4.30 (L) 04/29/2024 01:17 PM    RBC 4.30 (L) 04/29/2024 01:17 PM    HEMOGLOBIN 10.3 (L) 04/29/2024 01:17 PM    HEMOGLOBIN 10.3 (L) 04/29/2024 01:17 PM    HEMATOCRIT 33.0 (L) 04/29/2024 01:17 PM    HEMATOCRIT 33.0 (L) 04/29/2024 01:17 PM    MCV 76.7 (L) 04/29/2024 01:17 PM    MCV 76.7 (L) 04/29/2024 01:17 PM    MCH 24.0 (L) 04/29/2024 01:17 PM    MCH 24.0 (L) 04/29/2024 01:17 PM    MCHC 31.2 (L) 04/29/2024 01:17 PM    MCHC 31.2 (L) 04/29/2024 01:17 PM    MPV 8.6 (L) 04/29/2024 01:17 PM    MPV 8.6 (L) 04/29/2024 01:17 PM      Lab Results   Component Value Date/Time    SODIUM 137 04/30/2024 03:48 AM    POTASSIUM 3.3 (L) 04/30/2024 03:48 AM    CHLORIDE 101 04/30/2024 03:48 AM    CO2 25 04/30/2024 03:48 AM    GLUCOSE 112 (H) 04/30/2024 03:48 AM    BUN 11 04/30/2024 03:48 AM    CREATININE 0.59 04/30/2024 03:48 AM      Lab Results   Component Value Date/Time    ASTSGOT 16 04/28/2024 01:21 PM    ALTSGPT 5 04/28/2024 01:21 PM     Lab Results   Component Value Date/Time    CHOLSTRLTOT 75 (L) 04/30/2024 03:48 AM    LDL 36 04/30/2024 03:48 AM    HDL 26 (A) 04/30/2024 03:48 AM    TRIGLYCERIDE 67 04/30/2024 03:48 AM    TROPONINT 25 (H) 04/29/2024 07:46 AM       Recent Labs     04/28/24  1321   NTPROBNP 2655*     Cardiac Imaging and Procedures Review  EKG:  My personal interpretation of the EKG dated 4/29/2024 is sinus rhythm, rate 69, QTc 533 ms    Rhythm: My personal interpretation of the rhythm dated 4/30/2024 is sinus rhythm, rate 58.     Echocardiogram 8/26/2023 ([facility])  Left ventricular ejection fraction 60-65%.  Left ventricular wall motion is normal  Right ventricular size and systolic function are normal.  No valvular abnormalities.     Echocardiogram 12/22/2023 ([facility])  Left ventricular ejection  fraction 50-55%  Moderate concentric left ventricular hypertrophy.  Normal right ventricular systolic function  Aortic sclerosis, trace aortic regurgitation  Mild pulmonary hypertension     Echocardiogram: 4/29/2024  Mildly reduced left ventricular systolic function.  Mild aortic valve stenosis.  Transvalvular gradients are - Peak: 22 mmHg, Mean: 13 mmHg.     Imaging  Chest X-Ray: 4/28/2024  1. There are bilateral perihilar and lower lobe parenchymal opacities which could be due to combination of edema and multifocal pneumonia.     Chest CTA 2/23/2024 ([facility])  1.  Normal pulmonary venous anatomy.  Diffuse bronchial wall thickening with scattered multifocal subsolid groundglass pulmonary nodules throughout all lung fields which appear clustered/tree-in-bud configuration.  Findings favor infectious/inflammatory etiologies  Moderate cardiomegaly with severe coronary calcification and small pericardial effusion  Additional more prominent and suspicious appearing pulmonary nodules are seen measuring 10-11 mm in size.  Mildly prominent mediastinal and right hilar lymph nods.     MPI 12/4/2023 ([place])  Left ventricular ejection fraction 33%  Small fixed defect.     Assessment  Hypoxic respiratory failure  Pneumonia  Mild LV dysfunction LVEF 45%  Aortic stenosis, mild  Abnormal chest CTA suspicious for interstitial or inflammatory processes  PAF  Afib ablation 3/4/2024  Antiarrhythmic therapy with sotalol  Anticoagulation on apixaban  Coronary calcification  Anemia, microcytic  H/O GI bleed 2023  Hypothyroidism, S/P I-131  Dyslipidemia     Recommendation Discussion  Had fair diuretic response to low-dose IV furosemide implicating mild heart failure superimposed on pneumonia  Remains on 4 L nasal cannula  Has had declining LVEF over the past 9 months based on prior available echocardiograms with CT scan showing extensive coronary calcification and a prior MPI showing a small defect suggesting obstructive  coronary artery disease that would warrant further evaluation.  Not an optimal candidate for coronary angiography in setting of pneumonia and undefined etiology of iron deficiency microcytic anemia.  Initially will proceed with repeat MPI for ischemic workup.  Tentatively holding apixaban  Continue sotalol.  Continue rosuvastatin  Reviewed and discussed his current cardiac condition with the patient and the results of the diagnostic tests and plan of care  Discussed treatment plan with William Ott MD     Thank you for allowing me to participate in the care of this patient.     Please contact me with any questions.     Claude Billings M.D.   Cardiologist, [facility]  [phone]

## 2024-04-30 NOTE — CARE PLAN
The patient is Stable - Low risk of patient condition declining or worsening    Shift Goals  Clinical Goals: Abx, Skin integrity  Patient Goals: Rest  Family Goals: CARLY    Progress made toward(s) clinical / shift goals:        Problem: Knowledge Deficit - Standard  Goal: Patient and family/care givers will demonstrate understanding of plan of care, disease process/condition, diagnostic tests and medications  Outcome: Progressing  Note: Pt educated on POC and disease processes. Pt verbalized understanding of medication regimen and interventions.      Problem: Fall Risk  Goal: Patient will remain free from falls  Outcome: Progressing  Note: Pt educated on importance of calling nurse before getting up. Fall precautions in place. Bed locked in lowest position. Call light and belongings within reach. Wristband and proper sign placed. Bed alarm on. No skid socks applied. Room free of clutter.       Problem: Skin Integrity  Goal: Skin integrity is maintained or improved  Outcome: Progressing  Note: Skin integrity monitored throughout the shift.

## 2024-05-01 PROBLEM — E43 SEVERE PROTEIN-CALORIE MALNUTRITION (HCC): Status: ACTIVE | Noted: 2024-05-01

## 2024-05-01 LAB
ANION GAP SERPL CALC-SCNC: 10 MMOL/L (ref 7–16)
BUN SERPL-MCNC: 12 MG/DL (ref 8–22)
CALCIUM SERPL-MCNC: 8.6 MG/DL (ref 8.5–10.5)
CHLORIDE SERPL-SCNC: 101 MMOL/L (ref 96–112)
CO2 SERPL-SCNC: 26 MMOL/L (ref 20–33)
CREAT SERPL-MCNC: 0.63 MG/DL (ref 0.5–1.4)
EKG IMPRESSION: NORMAL
ERYTHROCYTE [DISTWIDTH] IN BLOOD BY AUTOMATED COUNT: 52 FL (ref 35.9–50)
GFR SERPLBLD CREATININE-BSD FMLA CKD-EPI: 101 ML/MIN/1.73 M 2
GLUCOSE SERPL-MCNC: 104 MG/DL (ref 65–99)
HCT VFR BLD AUTO: 32.1 % (ref 42–52)
HGB BLD-MCNC: 9.9 G/DL (ref 14–18)
MCH RBC QN AUTO: 23.4 PG (ref 27–33)
MCHC RBC AUTO-ENTMCNC: 30.8 G/DL (ref 32.3–36.5)
MCV RBC AUTO: 75.9 FL (ref 81.4–97.8)
PLATELET # BLD AUTO: 369 K/UL (ref 164–446)
PMV BLD AUTO: 8.6 FL (ref 9–12.9)
POTASSIUM SERPL-SCNC: 3.5 MMOL/L (ref 3.6–5.5)
PROCALCITONIN SERPL-MCNC: 0.06 NG/ML
RBC # BLD AUTO: 4.23 M/UL (ref 4.7–6.1)
SODIUM SERPL-SCNC: 137 MMOL/L (ref 135–145)
WBC # BLD AUTO: 10.1 K/UL (ref 4.8–10.8)

## 2024-05-01 PROCEDURE — 99233 SBSQ HOSP IP/OBS HIGH 50: CPT | Performed by: INTERNAL MEDICINE

## 2024-05-01 PROCEDURE — 93010 ELECTROCARDIOGRAM REPORT: CPT | Performed by: INTERNAL MEDICINE

## 2024-05-01 PROCEDURE — 99232 SBSQ HOSP IP/OBS MODERATE 35: CPT | Performed by: INTERNAL MEDICINE

## 2024-05-01 RX ORDER — BENZONATATE 100 MG/1
100 CAPSULE ORAL 3 TIMES DAILY PRN
Status: DISCONTINUED | OUTPATIENT
Start: 2024-05-01 | End: 2024-05-04 | Stop reason: HOSPADM

## 2024-05-01 RX ORDER — GUAIFENESIN 600 MG/1
1200 TABLET, EXTENDED RELEASE ORAL EVERY 12 HOURS
Status: DISCONTINUED | OUTPATIENT
Start: 2024-05-01 | End: 2024-05-04 | Stop reason: HOSPADM

## 2024-05-01 RX ORDER — POTASSIUM CHLORIDE 20 MEQ/1
40 TABLET, EXTENDED RELEASE ORAL ONCE
Status: COMPLETED | OUTPATIENT
Start: 2024-05-01 | End: 2024-05-01

## 2024-05-01 RX ORDER — FUROSEMIDE 10 MG/ML
20 INJECTION INTRAMUSCULAR; INTRAVENOUS ONCE
Status: COMPLETED | OUTPATIENT
Start: 2024-05-01 | End: 2024-05-01

## 2024-05-01 RX ADMIN — OMEPRAZOLE 20 MG: 20 CAPSULE, DELAYED RELEASE ORAL at 05:17

## 2024-05-01 RX ADMIN — ROSUVASTATIN 10 MG: 10 TABLET, FILM COATED ORAL at 16:10

## 2024-05-01 RX ADMIN — AMPICILLIN AND SULBACTAM 3 G: 1; 2 INJECTION, POWDER, FOR SOLUTION INTRAMUSCULAR; INTRAVENOUS at 16:16

## 2024-05-01 RX ADMIN — Medication 2000 UNITS: at 05:18

## 2024-05-01 RX ADMIN — POTASSIUM CHLORIDE 40 MEQ: 1500 TABLET, EXTENDED RELEASE ORAL at 07:42

## 2024-05-01 RX ADMIN — SOTALOL HYDROCHLORIDE 80 MG: 80 TABLET ORAL at 05:16

## 2024-05-01 RX ADMIN — GUAIFENESIN 1200 MG: 600 TABLET, EXTENDED RELEASE ORAL at 16:09

## 2024-05-01 RX ADMIN — GUAIFENESIN SYRUP AND DEXTROMETHORPHAN 10 ML: 100; 10 SYRUP ORAL at 21:55

## 2024-05-01 RX ADMIN — AMPICILLIN AND SULBACTAM 3 G: 1; 2 INJECTION, POWDER, FOR SOLUTION INTRAMUSCULAR; INTRAVENOUS at 23:19

## 2024-05-01 RX ADMIN — GUAIFENESIN 1200 MG: 600 TABLET, EXTENDED RELEASE ORAL at 11:04

## 2024-05-01 RX ADMIN — ALLOPURINOL 300 MG: 300 TABLET ORAL at 05:17

## 2024-05-01 RX ADMIN — ACETAMINOPHEN 650 MG: 325 TABLET, FILM COATED ORAL at 01:03

## 2024-05-01 RX ADMIN — AMPICILLIN AND SULBACTAM 3 G: 1; 2 INJECTION, POWDER, FOR SOLUTION INTRAMUSCULAR; INTRAVENOUS at 01:01

## 2024-05-01 RX ADMIN — AMPICILLIN AND SULBACTAM 3 G: 1; 2 INJECTION, POWDER, FOR SOLUTION INTRAMUSCULAR; INTRAVENOUS at 05:19

## 2024-05-01 RX ADMIN — LEVOTHYROXINE SODIUM 200 MCG: 0.1 TABLET ORAL at 05:16

## 2024-05-01 RX ADMIN — FUROSEMIDE 20 MG: 10 INJECTION INTRAMUSCULAR; INTRAVENOUS at 10:42

## 2024-05-01 RX ADMIN — DOXYCYCLINE 100 MG: 100 TABLET, FILM COATED ORAL at 05:17

## 2024-05-01 RX ADMIN — Medication 1 LOZENGE: at 23:52

## 2024-05-01 RX ADMIN — AMPICILLIN AND SULBACTAM 3 G: 1; 2 INJECTION, POWDER, FOR SOLUTION INTRAMUSCULAR; INTRAVENOUS at 10:42

## 2024-05-01 RX ADMIN — SOTALOL HYDROCHLORIDE 80 MG: 80 TABLET ORAL at 16:09

## 2024-05-01 RX ADMIN — DOXYCYCLINE 100 MG: 100 TABLET, FILM COATED ORAL at 16:09

## 2024-05-01 RX ADMIN — MONTELUKAST 10 MG: 10 TABLET, FILM COATED ORAL at 05:17

## 2024-05-01 RX ADMIN — OMEPRAZOLE 20 MG: 20 CAPSULE, DELAYED RELEASE ORAL at 16:10

## 2024-05-01 RX ADMIN — SENNOSIDES AND DOCUSATE SODIUM 2 TABLET: 50; 8.6 TABLET ORAL at 16:10

## 2024-05-01 ASSESSMENT — ENCOUNTER SYMPTOMS
NAUSEA: 0
PALPITATIONS: 0
CHEST TIGHTNESS: 0
ABDOMINAL PAIN: 0
HEADACHES: 0
SHORTNESS OF BREATH: 0
DIZZINESS: 0

## 2024-05-01 ASSESSMENT — FIBROSIS 4 INDEX: FIB4 SCORE: 1.38

## 2024-05-01 NOTE — DOCUMENTATION QUERY
[facility]                                                                       Query Response Note      PATIENT:               ASHLEY DELA CRUZ  ACCT #:                  [id number]  MRN:                     [id number]  :                      1950  ADMIT DATE:       2024 1:29 PM  DISCH DATE:          RESPONDING  PROVIDER #:        [id number]           QUERY TEXT:    Per Registered Dietician Assessment patient meets ASPEN criteria for severe malnutrition.    Please clarify the nutritional status based upon the clinical indicators and treatment.      The patient's Clinical Indicators include:   Dietary Eval:  pt meets ASPEN criteria for severe malnutrition in the context of acute illness r/t pneumonia AEB severe wt loss of 10% x6 weeks and inadequate energy intake of <50% of estimated needs for > 5 days  Risk Factors: decreased PO intake, 10% wt loss   Treatment: Dietary Eval, Ensure BID, encourage/ document PO intake, monitor wt    Thank you,  Sri Grace RN, BSN, CCDS  Clinical   Connect via [employer]  Options provided:   -- Malnutrition, Severe Protein Calorie   -- Malnutrition, Moderate Protein Calorie   -- Malnutrition, Mild Protein Calorie   -- Insignificant Findings   -- Other explanation, (please specify other explanation)      Query created by: Sri Grace on 2024 12:37 PM    RESPONSE TEXT:    Malnutrition, Severe Protein Calorie          Electronically signed by:  JAVON SANCHEZ MD 2024 7:27 AM

## 2024-05-01 NOTE — PROGRESS NOTES
Monitor Summary  Rhythm: Normal Sinus Rhythm - SB  Rate: 48-65  Ectopy: PVCs, PACs, Couplets  .16 / .09 / .47

## 2024-05-01 NOTE — DISCHARGE PLANNING
Case Management Discharge Planning    Admission Date: 2024  GMLOS: 5  ALOS: 3    6-Clicks ADL Score: 24  6-Clicks Mobility Score: 24      Anticipated Discharge Dispo: Discharge Disposition: Discharged to home/self care ()  Discharge Contact Phone Number: [phone]    DME Needed: possibly home oxygen    Action(s) Taken:   RN JENNIFER met with patient at bedside.  Pt sitting side of bed, receiving O2 2.5 LNC.  Pt stated he turned into a snowbird a couple of years ago after his wife .  He lives in [place] [place] and [facility].  He plans to dc for several days to his nieces house in [place] and then up to [place] to relatives for a week and then proceed to [place].  Pt is independent with ADLs and IADLs.  I provided education on home oxygen.  Pt agreeable if needed.  He signed choice form for [employer] and [employer].  Form faxed to [facility].    Medically Clear: No    Next Steps:   Coordinate home oxygen if needed.    Barriers to Discharge: Medical clearance and DME    Care Transition Team Assessment    Information Source  Orientation Level: Oriented X4  Information Given By: Patient  Who is responsible for making decisions for patient? : Patient    Readmission Evaluation  Is this a readmission?: No    Elopement Risk  Legal Hold: No  Ambulatory or Self Mobile in Wheelchair: Yes  Disoriented: No  Psychiatric Symptoms: None  History of Wandering: No  Elopement this Admit: No  Vocalizing Wanting to Leave: No  Displays Behaviors, Body Language Wanting to Leave: No-Not at Risk for Elopement  Elopement Risk: Not at Risk for Elopement    Interdisciplinary Discharge Planning  Lives with - Patient's Self Care Capacity: Alone and Able to Care For Self  Patient or legal guardian wants to designate a caregiver: No  Support Systems: Family Member(s), Friends / Neighbors, Children  Housing / Facility: Motor Home  Durable Medical Equipment: Not Applicable    Discharge Preparedness  What is your plan after discharge?: Home with  help  What are your discharge supports?: Child, Other (comment)  Prior Functional Level: Ambulatory, Drives Self, Independent with Activities of Daily Living, Independent with Medication Management  Difficulity with ADLs: None  Difficulity with IADLs: None    Functional Assesment  Prior Functional Level: Ambulatory, Drives Self, Independent with Activities of Daily Living, Independent with Medication Management    Finances  Financial Barriers to Discharge: No  Prescription Coverage: Yes    Vision / Hearing Impairment  Vision Impairment : No  Right Eye Vision: Wears Glasses, Impaired  Left Eye Vision: Wears Glasses, Impaired  Hearing Impairment : No  Hearing Impairment: Both Ears, Hearing Device Not Available         Advance Directive  Advance Directive?: None    Domestic Abuse  Have you ever been the victim of abuse or violence?: No  Physical Abuse or Sexual Abuse: No  Verbal Abuse or Emotional Abuse: No  Possible Abuse/Neglect Reported to:: Not Applicable    Psychological Assessment  History of Substance Abuse: None  History of Psychiatric Problems: No    Discharge Risks or Barriers  Discharge risks or barriers?: No PCP  Patient risk factors: No PCP    Anticipated Discharge Information  Discharge Disposition: Discharged to home/self care (01)  Discharge Contact Phone Number: [phone]

## 2024-05-01 NOTE — PROGRESS NOTES
Cardiology Follow Up Progress Note    Date of Service  5/1/2024    Attending Physician  William Ott M.D.    NAHID Mueller Dontrell Ly is a 73 y.o. male admitted 4/28/2024 with a past medical history of atrial fibrillation diagnosed 9/2023, DCCV ([facility]), Afib ablation 3/4/2024 ([facility], Todd Vilchis MD), GI bleed 2023 ([place], [place]) who presented 4/28/2024 with persistent productive cough, hypoxia, abnormal chest x-ray diagnosed with pneumonia and started on antibiotics.  Had a abnormal echocardiogram showing left ventricular ejection fraction of 45% mild aortic stenosis.  Cardiology consultation was requested.    Interim Events  4/30: /69.  HR 68.  Sinus.  UO 1600 cc with IV furosemide.  Persistent intermittent cough.  No shortness of breath or chest pain.  5/1: /64.  HR 54.  Sinus.  Overall feels better.  Less cough.  No chest pain or shortness of breath but is not ambulated.  O2 sat 90% on 2.5 L..  Occult blood negative.    Review of Systems  Review of Systems   Respiratory:  Negative for chest tightness and shortness of breath.    Cardiovascular:  Negative for palpitations.   Gastrointestinal:  Negative for abdominal pain and nausea.   Neurological:  Negative for dizziness and headaches.       Vital signs in last 24 hours  Temp:  [36.5 °C (97.7 °F)-37.2 °C (99 °F)] 36.5 °C (97.7 °F)  Pulse:  [54-67] 58  Resp:  [16-18] 16  BP: (115-142)/(63-80) 115/71  SpO2:  [90 %-97 %] 90 %    Physical Exam  Physical Exam  Constitutional:       General: He is not in acute distress.  Neck:      Comments: Normal JVP  Cardiovascular:      Rate and Rhythm: Normal rate and regular rhythm.      Heart sounds: S1 normal and S2 normal. Murmur heard.      No friction rub. No gallop.   Pulmonary:      Effort: Pulmonary effort is normal.      Breath sounds: Normal breath sounds. No wheezing, rhonchi or rales.      Comments: Lungs are clearer  Musculoskeletal:      Right lower leg: No edema.       Left lower leg: No edema.   Skin:     General: Skin is warm and dry.   Neurological:      Mental Status: He is alert and oriented to person, place, and time.   Psychiatric:         Behavior: Behavior normal.         Lab Review  Lab Results   Component Value Date/Time    WBC 10.1 05/01/2024 01:19 AM    RBC 4.23 (L) 05/01/2024 01:19 AM    HEMOGLOBIN 9.9 (L) 05/01/2024 01:19 AM    HEMATOCRIT 32.1 (L) 05/01/2024 01:19 AM    MCV 75.9 (L) 05/01/2024 01:19 AM    MCH 23.4 (L) 05/01/2024 01:19 AM    MCHC 30.8 (L) 05/01/2024 01:19 AM    MPV 8.6 (L) 05/01/2024 01:19 AM      Lab Results   Component Value Date/Time    SODIUM 137 05/01/2024 01:19 AM    POTASSIUM 3.5 (L) 05/01/2024 01:19 AM    CHLORIDE 101 05/01/2024 01:19 AM    CO2 26 05/01/2024 01:19 AM    GLUCOSE 104 (H) 05/01/2024 01:19 AM    BUN 12 05/01/2024 01:19 AM    CREATININE 0.63 05/01/2024 01:19 AM      Lab Results   Component Value Date/Time    ASTSGOT 16 04/28/2024 01:21 PM    ALTSGPT 5 04/28/2024 01:21 PM     Lab Results   Component Value Date/Time    CHOLSTRLTOT 75 (L) 04/30/2024 03:48 AM    LDL 36 04/30/2024 03:48 AM    HDL 26 (A) 04/30/2024 03:48 AM    TRIGLYCERIDE 67 04/30/2024 03:48 AM    TROPONINT 25 (H) 04/29/2024 07:46 AM       Recent Labs     04/28/24  1321   NTPROBNP 2655*     Cardiac Imaging and Procedures Review  EKG:  My personal interpretation of the EKG dated 4/29/2024 is sinus rhythm, rate 69, QTc 533 ms    Rhythm: My personal interpretation of the rhythm dated 4/30/2024 is sinus rhythm, rate 58.    Rhythm: My personal interpretation of the rhythm dated 5/1/2024 is sinus rhythm.     Echocardiogram 8/26/2023 ([facility])  Left ventricular ejection fraction 60-65%.  Left ventricular wall motion is normal  Right ventricular size and systolic function are normal.  No valvular abnormalities.     Echocardiogram 12/22/2023 ([facility])  Left ventricular ejection fraction 50-55%  Moderate concentric left ventricular  hypertrophy.  Normal right ventricular systolic function  Aortic sclerosis, trace aortic regurgitation  Mild pulmonary hypertension     Echocardiogram: 4/29/2024  Mildly reduced left ventricular systolic function.  Mild aortic valve stenosis.  Transvalvular gradients are - Peak: 22 mmHg, Mean: 13 mmHg.     Imaging  Chest X-Ray: 4/28/2024  1. There are bilateral perihilar and lower lobe parenchymal opacities which could be due to combination of edema and multifocal pneumonia.     Chest CTA 2/23/2024 ([facility])  1.  Normal pulmonary venous anatomy.  Diffuse bronchial wall thickening with scattered multifocal subsolid groundglass pulmonary nodules throughout all lung fields which appear clustered/tree-in-bud configuration.  Findings favor infectious/inflammatory etiologies  Moderate cardiomegaly with severe coronary calcification and small pericardial effusion  Additional more prominent and suspicious appearing pulmonary nodules are seen measuring 10-11 mm in size.  Mildly prominent mediastinal and right hilar lymph nods.     MPI 12/4/2023 ([place])  Left ventricular ejection fraction 33%  Small fixed defect.     MPI4/30/2024   Mildly reduced ejection fraction.      No evidence of significant jeopardized viable myocardium or prior myocardial    infarction.   ECG INTERPRETATION   Nondiagnostic stress ECG with regadenosoMild global hypokinesis. LV ejection fraction = 43%.     Assessment  Hypoxic respiratory failure  Pneumonia  Mild LV dysfunction LVEF 45%  Aortic stenosis, mild  Abnormal chest CTA suspicious for interstitial or inflammatory processes  PAF  Afib ablation 3/4/2024  Antiarrhythmic therapy with sotalol  Anticoagulation on apixaban  Coronary calcification  Anemia, microcytic  H/O GI bleed 2023  Hypothyroidism, S/P I-131  Dyslipidemia     Recommendation Discussion  Heart failure with declining LVEF  Clinically improving  Additional IV Lasix x 1.  MPI is reassuring but cannot exclude balanced  ischemia with known extensive coronary calcification by prior CT scan.  After further discussion we will proceed with a coronary angiogram to exclude multivessel obstructive CAD as a cause for his declining LVEF.  The reasons, risks and potential therapeutic scenarios were discussed with the patient after which he expressed understanding wish to proceed.  Microcytic anemia with negative occult blood.  Holding apixaban  Continue sotalol.  Continue rosuvastatin  Discussed treatment plan with William Ott MD     Thank you for allowing me to participate in the care of this patient.     Please contact me with any questions.     Claude Billings M.D.   Cardiologist, [facility]  [phone]

## 2024-05-01 NOTE — PROGRESS NOTES
Bedside report received from off going RN/tech: Chris, assumed care of patient.     Fall Risk Score: LOW RISK  Fall risk interventions in place: Provide patient/family education based on risk assessment and Educate patient/family to call staff for assistance when getting out of bed  Bed type: Regular (Sam Score less than 17 interventions in place)  Patient on cardiac monitor: Yes  IVF/IV medications: Not Applicable   Oxygen: How many liters 2.5L  Bedside sitter: Not Applicable   Isolation: Not applicable

## 2024-05-01 NOTE — CARE PLAN
Problem: Knowledge Deficit - Standard  Goal: Patient and family/care givers will demonstrate understanding of plan of care, disease process/condition, diagnostic tests and medications  Outcome: Progressing     Problem: Risk for Infection - COPD  Goal: Patient will remain free from signs and symptoms of infection  Outcome: Progressing     Problem: Impaired Gas Exchange  Goal: Patient will demonstrate improved ventilation and adequate oxygenation and participate in treatment regimen within the level of ability/situation.  Outcome: Progressing   The patient is Stable - Low risk of patient condition declining or worsening    Shift Goals  Clinical Goals: Wean O2, hemodynamic stability  Patient Goals: Rest  Family Goals: CARLY    Progress made toward(s) clinical / shift goals:  Encourage IS, wean O2, encourage ambulation.     Patient is not progressing towards the following goals: n/a

## 2024-05-01 NOTE — PROGRESS NOTES
[facility] Medicine Daily Progress Note    Date of Service  5/1/2024    Chief Complaint  shortness of breath     Hospital Course  Ervin Ly is a 73 y.o. male with chronic atrial fibrillation on Eliquis and sotalol with history of prior ablation, HTN, HLD, gout, GERD, new diagnosis of colon cancer (that has not yet been treated) and reactive airway disease, who presented to [facility] on 4/28/2024 due to shortness of breath and not  feeling well for the past 3 weeks with subjective fever, chills, productive sputum, and weakness. He reported traveling to [place] recently, but denied sick contact. He has been living in his  and was planning to drive from [place] to [place]. In [place] at the  park he became very weak and dizzy, he require assistance getting into his car and then proceeded to drive while he was symptomatic to [place]. Here, he was seen at urgent care due to concern of multifocal pneumonia on CXR and was hypoxic and he was sent to the ED.  On evaluation, he was requiring 2 to 4 L of oxygen by nasal cannula.  WBC was 18,400.  Sodium 134, potassium 3.3, BNP of 2655, and troponin was 25.  Chest x-ray showed bilateral perihilar and lower lobe parenchymal opacities.  Patient started on antibiotics.  Echocardiogram was obtained which showed reduced EF of 45%, with no prior history of CHF.  He was started on diuresis, and cardiology was consulted who recommended ischemic workup with MPI. Blood cultures remained negative.  Sputum cultures grew MSSA. He diuresed well.  Lipid profile was at goal.    Interval Problem Update  5/1/2024 - I reviewed the patient's chart today. Uneventful night. VSS. Afebrile.  Maintained sinus rhythm with PVCs on telemetry monitoring.  Saturating well on 3L O2 NC.  WBC count has normalized.  Hemoglobin 9.9.  Platelet count is normal.  Potassium 3.5.  Creatinine remained normal.  Sodium is normal.  Procalcitonin now low at 0.06.  Nuclear cardiac stress test showed no evidence of  significant jeopardized viable myocardium or prior MI.    > I have personally seen and examined the patient today.  He appears euvolemic.  No leg edema.  Breathing better.  However has persistent nagging cough productive of tan-colored phlegm.  No nausea or vomiting.  No bowel movement changes.    > I personally reviewed and discussed with cardiology regarding further recommendations, stress test negative but cannot rule out balanced ischemia, and recommended proceeding with C to be scheduled for tomorrow.      I personally reviewed all lab results mentioned above. Prior medical records from this institution and outside facilities were independently reviewed as noted. I also personally reviewed all ER physician and consultant recommendations and plans as documented above. History was independently obtained by myself. I have discussed this patient's plan of care and discharge plan at IDT rounds today with Case Management, Nursing, Nursing leadership, and other members of the IDT team.      Consultants/Specialty  Cardiology [place]    Code Status  Full Code    Disposition  The patient is not medically cleared for discharge to home or a post-acute facility.      6 clicks 24 and 24.  Anticipate discharge to home once medically cleared.  I have placed the appropriate orders for post-discharge needs.    Review of Systems  ROS     Pertinent positives/negatives as mentioned above.     A complete review of systems was personally done by me. All other systems were negative.       Physical Exam  Temp:  [36.5 °C (97.7 °F)-37.2 °C (99 °F)] 36.7 °C (98.1 °F)  Pulse:  [54-67] 58  Resp:  [16-18] 16  BP: ()/(63-80) 94/76  SpO2:  [90 %-97 %] 93 %    Physical Exam  Vitals and nursing note reviewed. Exam conducted with a chaperone present.   Constitutional:       General: He is not in acute distress.     Appearance: Normal appearance. He is not toxic-appearing or diaphoretic.   HENT:      Head: Normocephalic and atraumatic.       Right Ear: External ear normal.      Left Ear: External ear normal.      Nose: Nose normal.      Mouth/Throat:      Mouth: Mucous membranes are moist.      Pharynx: No oropharyngeal exudate.   Eyes:      General: No scleral icterus.     Extraocular Movements: Extraocular movements intact.      Conjunctiva/sclera: Conjunctivae normal.      Pupils: Pupils are equal, round, and reactive to light.   Cardiovascular:      Rate and Rhythm: Normal rate and regular rhythm.      Pulses: Normal pulses.      Heart sounds: Normal heart sounds. No murmur heard.     No gallop.   Pulmonary:      Effort: Pulmonary effort is normal. No respiratory distress.      Breath sounds: No stridor. No wheezing, rhonchi or rales.      Comments: Diminished air entry left base, (+) occasional crackles  Chest:      Chest wall: No tenderness.   Abdominal:      General: Abdomen is flat. Bowel sounds are normal. There is no distension.      Palpations: Abdomen is soft. There is no mass.      Tenderness: There is no abdominal tenderness. There is no guarding or rebound.   Musculoskeletal:         General: No swelling or deformity. Normal range of motion.      Cervical back: Normal range of motion and neck supple.      Right lower leg: No edema.      Left lower leg: No edema.   Lymphadenopathy:      Cervical: No cervical adenopathy.   Skin:     General: Skin is warm and dry.      Capillary Refill: Capillary refill takes less than 2 seconds.      Coloration: Skin is not jaundiced.      Findings: No rash.   Neurological:      General: No focal deficit present.      Mental Status: He is alert and oriented to person, place, and time.      Cranial Nerves: No cranial nerve deficit.   Psychiatric:         Mood and Affect: Mood normal.         Behavior: Behavior normal.         Thought Content: Thought content normal.         Judgment: Judgment normal.       I have performed the physical examination today 5/1/2024.  In review of yesterday's note, there  are no new changes except as documented above.      Fluids    Intake/Output Summary (Last 24 hours) at 5/1/2024 1339  Last data filed at 5/1/2024 1130  Gross per 24 hour   Intake 920 ml   Output 1700 ml   Net -780 ml       Laboratory  Recent Labs     04/29/24  1317 05/01/24  0119   WBC 15.5*  15.5* 10.1   RBC 4.30*  4.30* 4.23*   HEMOGLOBIN 10.3*  10.3* 9.9*   HEMATOCRIT 33.0*  33.0* 32.1*   MCV 76.7*  76.7* 75.9*   MCH 24.0*  24.0* 23.4*   MCHC 31.2*  31.2* 30.8*   RDW 52.6*  52.6* 52.0*   PLATELETCT 348  348 369   MPV 8.6*  8.6* 8.6*     Recent Labs     04/29/24  1317 04/30/24  0348 05/01/24  0119   SODIUM 138 137 137   POTASSIUM 3.6 3.3* 3.5*   CHLORIDE 102 101 101   CO2 24 25 26   GLUCOSE 123* 112* 104*   BUN 14 11 12   CREATININE 0.71 0.59 0.63   CALCIUM 8.7 8.3* 8.6               Recent Labs     04/30/24  0348   TRIGLYCERIDE 67   HDL 26*   LDL 36       Imaging  NM-CARDIAC STRESS TEST   Final Result      EC-ECHOCARDIOGRAM COMPLETE W/ CONT   Final Result           Assessment/Plan  * Multifocal pneumonia- (present on admission)  Assessment & Plan  - Chest x-ray showed bilateral perihilar and lower lobe parenchymal opacities.  Still requiring supplemental oxygen.  Sputum cultures growing MSSA.  -Continue antibiotics with IV Unasyn and doxycycline.  -Continue respiratory support with RT protocol, supplemental oxygen.  Keep saturations above 88%, wean as able from O2.   -WBC count has normalized, and procalcitonin are low.  Continue to trend.      Acute HFrEF (heart failure with reduced ejection fraction) (HCC)- (present on admission)  Assessment & Plan  - New.  No known history.  Initially volume overloaded, but now appears euvolemic.  -MPI negative for ischemia, however cannot rule out balanced ischemia with known extensive coronary calcification on CT.  Proceed with cardiac catheterization.  -Cardiology following.  Will give a dose of IV Lasix x 1.  -Will need to optimize GDMT.  -Monitor on  telemetry.      Acute respiratory failure with hypoxia (HCC)- (present on admission)  Assessment & Plan  -Multifactorial from pneumonia and HFrEF.  Remains on 3 L of oxygen by nasal cannula.  -Continue antibiotics and diuretics.  -Further cardiac ischemic workup as above.  -Continue respiratory support with RT protocol.  Continue supplemental oxygen, keep saturation above 88% and wean as able.    QT prolongation- (present on admission)  Assessment & Plan  -On sotalol.  Repeat EKG showed QTc of 486.  -Continue to monitor on telemetry.  -Avoid QTc prolonging medications.    Elevated troponin- (present on admission)  Assessment & Plan  -Probable demand ischemia, minimally elevated and has remained flat.   -Further ischemic workup with MPI as above.  -Continue statin and Eliquis.    Hypokalemia- (present on admission)  Assessment & Plan  - Potassium 3.5 today.  Will give another dose of 40 mEq of oral K-Dur.  Magnesium level was normal.  -Continue to monitor, BMP in the morning.    Acquired circulating anticoagulants (HCC)- (present on admission)  Assessment & Plan  - Holding Eliquis for now for [facility].    ACP (advance care planning)- (present on admission)  Assessment & Plan  -Goals of care discussion this admission, patient wishes to remain full code.    Reactive airway disease- (present on admission)  Assessment & Plan  - Not in acute exacerbation.  Continue as needed bronchodilators, along with home Singulair.  -Continue RT protocol.    Hypertension- (present on admission)  Assessment & Plan  -Maintaining good blood pressure control.  Continue sotalol. Monitor blood pressure trend closely, continue as needed IV labetalol for significant symptomatic hypertension.  Optimize blood pressure control.    Hypothyroidism- (present on admission)  Assessment & Plan  -Continue Synthroid.      Hyperlipidemia- (present on admission)  Assessment & Plan  -Continue statin.    Chronic a-fib (HCC)- (present on admission)  Assessment &  Plan  - Maintaining rate control.  Continue sotalol.  Continue anticoagulation with Eliquis (hold for [facility]).  Continue to monitor on telemetry.         VTE prophylaxis: Eliquis, SCD    My total time spent caring for the patient on the day of the encounter was 51 minutes. This does not include time spent on separately billable procedures/tests.

## 2024-05-02 ENCOUNTER — APPOINTMENT (OUTPATIENT)
Dept: RADIOLOGY | Facility: MEDICAL CENTER | Age: 74
DRG: 981 | End: 2024-05-02
Attending: INTERNAL MEDICINE
Payer: MEDICARE

## 2024-05-02 LAB
ANION GAP SERPL CALC-SCNC: 10 MMOL/L (ref 7–16)
BUN SERPL-MCNC: 12 MG/DL (ref 8–22)
CALCIUM SERPL-MCNC: 8.4 MG/DL (ref 8.5–10.5)
CHLORIDE SERPL-SCNC: 99 MMOL/L (ref 96–112)
CO2 SERPL-SCNC: 28 MMOL/L (ref 20–33)
CREAT SERPL-MCNC: 0.64 MG/DL (ref 0.5–1.4)
ERYTHROCYTE [DISTWIDTH] IN BLOOD BY AUTOMATED COUNT: 51.8 FL (ref 35.9–50)
GFR SERPLBLD CREATININE-BSD FMLA CKD-EPI: 101 ML/MIN/1.73 M 2
GLUCOSE SERPL-MCNC: 98 MG/DL (ref 65–99)
HCT VFR BLD AUTO: 32.8 % (ref 42–52)
HGB BLD-MCNC: 9.9 G/DL (ref 14–18)
MCH RBC QN AUTO: 23.1 PG (ref 27–33)
MCHC RBC AUTO-ENTMCNC: 30.2 G/DL (ref 32.3–36.5)
MCV RBC AUTO: 76.6 FL (ref 81.4–97.8)
PLATELET # BLD AUTO: 344 K/UL (ref 164–446)
PMV BLD AUTO: 8.8 FL (ref 9–12.9)
POTASSIUM SERPL-SCNC: 3.5 MMOL/L (ref 3.6–5.5)
PROCALCITONIN SERPL-MCNC: 0.05 NG/ML
RBC # BLD AUTO: 4.28 M/UL (ref 4.7–6.1)
SODIUM SERPL-SCNC: 137 MMOL/L (ref 135–145)
WBC # BLD AUTO: 9 K/UL (ref 4.8–10.8)

## 2024-05-02 PROCEDURE — 99233 SBSQ HOSP IP/OBS HIGH 50: CPT | Performed by: INTERNAL MEDICINE

## 2024-05-02 PROCEDURE — 99232 SBSQ HOSP IP/OBS MODERATE 35: CPT | Performed by: INTERNAL MEDICINE

## 2024-05-02 RX ORDER — POTASSIUM CHLORIDE 20 MEQ/1
40 TABLET, EXTENDED RELEASE ORAL ONCE
Status: COMPLETED | OUTPATIENT
Start: 2024-05-02 | End: 2024-05-02

## 2024-05-02 RX ORDER — FUROSEMIDE 10 MG/ML
20 INJECTION INTRAMUSCULAR; INTRAVENOUS DAILY
Status: DISCONTINUED | OUTPATIENT
Start: 2024-05-02 | End: 2024-05-04

## 2024-05-02 RX ORDER — AMOXICILLIN AND CLAVULANATE POTASSIUM 875; 125 MG/1; MG/1
1 TABLET, FILM COATED ORAL EVERY 12 HOURS
Status: COMPLETED | OUTPATIENT
Start: 2024-05-02 | End: 2024-05-02

## 2024-05-02 RX ORDER — SPIRONOLACTONE 25 MG/1
25 TABLET ORAL
Status: DISCONTINUED | OUTPATIENT
Start: 2024-05-02 | End: 2024-05-04 | Stop reason: HOSPADM

## 2024-05-02 RX ADMIN — OMEPRAZOLE 20 MG: 20 CAPSULE, DELAYED RELEASE ORAL at 18:01

## 2024-05-02 RX ADMIN — POTASSIUM CHLORIDE 40 MEQ: 1500 TABLET, EXTENDED RELEASE ORAL at 08:49

## 2024-05-02 RX ADMIN — ROSUVASTATIN 10 MG: 10 TABLET, FILM COATED ORAL at 18:01

## 2024-05-02 RX ADMIN — Medication 1 LOZENGE: at 21:30

## 2024-05-02 RX ADMIN — SPIRONOLACTONE 25 MG: 25 TABLET ORAL at 08:49

## 2024-05-02 RX ADMIN — DOXYCYCLINE 100 MG: 100 TABLET, FILM COATED ORAL at 05:17

## 2024-05-02 RX ADMIN — Medication 2000 UNITS: at 05:17

## 2024-05-02 RX ADMIN — SOTALOL HYDROCHLORIDE 80 MG: 80 TABLET ORAL at 05:17

## 2024-05-02 RX ADMIN — DOXYCYCLINE 100 MG: 100 TABLET, FILM COATED ORAL at 18:01

## 2024-05-02 RX ADMIN — SENNOSIDES AND DOCUSATE SODIUM 2 TABLET: 50; 8.6 TABLET ORAL at 18:01

## 2024-05-02 RX ADMIN — ALLOPURINOL 300 MG: 300 TABLET ORAL at 05:17

## 2024-05-02 RX ADMIN — LEVOTHYROXINE SODIUM 200 MCG: 0.1 TABLET ORAL at 05:17

## 2024-05-02 RX ADMIN — OMEPRAZOLE 20 MG: 20 CAPSULE, DELAYED RELEASE ORAL at 05:17

## 2024-05-02 RX ADMIN — GUAIFENESIN 1200 MG: 600 TABLET, EXTENDED RELEASE ORAL at 05:18

## 2024-05-02 RX ADMIN — FUROSEMIDE 20 MG: 10 INJECTION INTRAMUSCULAR; INTRAVENOUS at 10:23

## 2024-05-02 RX ADMIN — Medication 1 LOZENGE: at 05:24

## 2024-05-02 RX ADMIN — AMOXICILLIN AND CLAVULANATE POTASSIUM 1 TABLET: 875; 125 TABLET, FILM COATED ORAL at 12:06

## 2024-05-02 RX ADMIN — AMPICILLIN AND SULBACTAM 3 G: 1; 2 INJECTION, POWDER, FOR SOLUTION INTRAMUSCULAR; INTRAVENOUS at 05:22

## 2024-05-02 RX ADMIN — SOTALOL HYDROCHLORIDE 80 MG: 80 TABLET ORAL at 18:01

## 2024-05-02 RX ADMIN — Medication 1 LOZENGE: at 10:24

## 2024-05-02 RX ADMIN — MONTELUKAST 10 MG: 10 TABLET, FILM COATED ORAL at 05:18

## 2024-05-02 RX ADMIN — AMOXICILLIN AND CLAVULANATE POTASSIUM 1 TABLET: 875; 125 TABLET, FILM COATED ORAL at 23:40

## 2024-05-02 RX ADMIN — GUAIFENESIN 1200 MG: 600 TABLET, EXTENDED RELEASE ORAL at 18:00

## 2024-05-02 ASSESSMENT — ENCOUNTER SYMPTOMS
PALPITATIONS: 0
HEADACHES: 0
DIZZINESS: 0
ABDOMINAL PAIN: 0
CHEST TIGHTNESS: 0
SHORTNESS OF BREATH: 0
NAUSEA: 0

## 2024-05-02 ASSESSMENT — PAIN DESCRIPTION - PAIN TYPE: TYPE: ACUTE PAIN

## 2024-05-02 ASSESSMENT — FIBROSIS 4 INDEX: FIB4 SCORE: 1.48

## 2024-05-02 NOTE — PROGRESS NOTES
Cardiology Follow Up Progress Note    Date of Service  5/2/2024    Attending Physician  William Ott M.D.    NAHID Mueller Dontrell Ly is a 73 y.o. male admitted 4/28/2024 with a past medical history of atrial fibrillation diagnosed 9/2023, DCCV ([facility]), Afib ablation 3/4/2024 ([facility] [place] [facility], Todd Vilchis MD), GI bleed 2023 ([place], [place]) who presented 4/28/2024 with persistent productive cough, hypoxia, abnormal chest x-ray diagnosed with pneumonia and started on antibiotics.  Had a abnormal echocardiogram showing left ventricular ejection fraction of 45% mild aortic stenosis.  Cardiology consultation was requested.    Interim Events  4/30: /69.  HR 68.  Sinus.  UO 1600 cc with IV furosemide.  Persistent intermittent cough.  No shortness of breath or chest pain.  5/1: /64.  HR 54.  Sinus.  Overall feels better.  Less cough.  No chest pain or shortness of breath but is not ambulated.  O2 sat 90% on 2.5 L..  Occult blood negative.  5/2: /71.  HR 59.  Sinus.  Overall feeling better.  Minimal cough.  No PND, orthopnea or LE edema.  No chest pain.  Follow-up CXR suggests pneumonitis, edema, with small left pleural effusion.    Review of Systems  Review of Systems   Respiratory:  Negative for chest tightness and shortness of breath.    Cardiovascular:  Negative for palpitations.   Gastrointestinal:  Negative for abdominal pain and nausea.   Neurological:  Negative for dizziness and headaches.       Vital signs in last 24 hours  Temp:  [36.6 °C (97.9 °F)-36.7 °C (98.1 °F)] 36.6 °C (97.9 °F)  Pulse:  [58-69] 59  Resp:  [16-17] 16  BP: ()/(58-80) 122/71  SpO2:  [90 %-95 %] 92 %    Physical Exam  Physical Exam  Constitutional:       General: He is not in acute distress.  Neck:      Comments: Normal JVP  Cardiovascular:      Rate and Rhythm: Normal rate and regular rhythm.      Heart sounds: S1 normal and S2 normal. Murmur heard.      No friction rub. No gallop.  "  Pulmonary:      Effort: Pulmonary effort is normal.      Breath sounds: Normal breath sounds. No wheezing, rhonchi or rales.      Comments: Lungs are clearer  Musculoskeletal:      Right lower leg: No edema.      Left lower leg: No edema.   Skin:     General: Skin is warm and dry.   Neurological:      Mental Status: He is alert and oriented to person, place, and time.   Psychiatric:         Behavior: Behavior normal.         Lab Review  Lab Results   Component Value Date/Time    WBC 9.0 05/02/2024 01:46 AM    RBC 4.28 (L) 05/02/2024 01:46 AM    HEMOGLOBIN 9.9 (L) 05/02/2024 01:46 AM    HEMATOCRIT 32.8 (L) 05/02/2024 01:46 AM    MCV 76.6 (L) 05/02/2024 01:46 AM    MCH 23.1 (L) 05/02/2024 01:46 AM    MCHC 30.2 (L) 05/02/2024 01:46 AM    MPV 8.8 (L) 05/02/2024 01:46 AM      Lab Results   Component Value Date/Time    SODIUM 137 05/02/2024 01:46 AM    POTASSIUM 3.5 (L) 05/02/2024 01:46 AM    CHLORIDE 99 05/02/2024 01:46 AM    CO2 28 05/02/2024 01:46 AM    GLUCOSE 98 05/02/2024 01:46 AM    BUN 12 05/02/2024 01:46 AM    CREATININE 0.64 05/02/2024 01:46 AM      Lab Results   Component Value Date/Time    ASTSGOT 16 04/28/2024 01:21 PM    ALTSGPT 5 04/28/2024 01:21 PM     Lab Results   Component Value Date/Time    CHOLSTRLTOT 75 (L) 04/30/2024 03:48 AM    LDL 36 04/30/2024 03:48 AM    HDL 26 (A) 04/30/2024 03:48 AM    TRIGLYCERIDE 67 04/30/2024 03:48 AM    TROPONINT 25 (H) 04/29/2024 07:46 AM       No results for input(s): \"NTPROBNP\" in the last 72 hours.    Cardiac Imaging and Procedures Review  EKG:  My personal interpretation of the EKG dated 4/29/2024 is sinus rhythm, rate 69, QTc 533 ms    Rhythm: My personal interpretation of the rhythm dated 4/30/2024 is sinus rhythm, rate 58.    Rhythm: My personal interpretation of the rhythm dated 5/1/2024 is sinus rhythm.    Rhythm: My personal interpretation of the rhythm dated 5/2/2024 is sinus rhythm     Echocardiogram 8/26/2023 ([facility])  Left ventricular " ejection fraction 60-65%.  Left ventricular wall motion is normal  Right ventricular size and systolic function are normal.  No valvular abnormalities.     [facility]  Left ventricular ejection fraction 50-55%  Moderate concentric left ventricular hypertrophy.  Normal right ventricular systolic function  Aortic sclerosis, trace aortic regurgitation  Mild pulmonary hypertension     Echocardiogram: 4/29/2024  Mildly reduced left ventricular systolic function.  Mild aortic valve stenosis.  Transvalvular gradients are - Peak: 22 mmHg, Mean: 13 mmHg.     Imaging  Chest X-Ray: 4/28/2024  1. There are bilateral perihilar and lower lobe parenchymal opacities which could be due to combination of edema and multifocal pneumonia.     Chest CTA 2/23/2024 ([facility])  1.  Normal pulmonary venous anatomy.  Diffuse bronchial wall thickening with scattered multifocal subsolid groundglass pulmonary nodules throughout all lung fields which appear clustered/tree-in-bud configuration.  Findings favor infectious/inflammatory etiologies  Moderate cardiomegaly with severe coronary calcification and small pericardial effusion  Additional more prominent and suspicious appearing pulmonary nodules are seen measuring 10-11 mm in size.  Mildly prominent mediastinal and right hilar lymph nods.     MPI 12/4/2023 ([place])  Left ventricular ejection fraction 33%  Small fixed defect.     MPI4/30/2024   Mildly reduced ejection fraction.      No evidence of significant jeopardized viable myocardium or prior myocardial    infarction.   ECG INTERPRETATION   Nondiagnostic stress ECG with regadenosoMild global hypokinesis. LV ejection fraction = 43%.     Assessment  Hypoxic respiratory failure  Pneumonia  Mild LV dysfunction LVEF 45%  Aortic stenosis, mild  Abnormal chest CTA suspicious for interstitial or inflammatory processes  PAF  Afib ablation 3/4/2024  Antiarrhythmic therapy with  sotalol  Anticoagulation on apixaban  Coronary calcification  Anemia, microcytic  H/O GI bleed 2023  Hypothyroidism, S/P I-131  Dyslipidemia     Recommendation Discussion  Clinically overall improving.  Heart failure with declining LVEF  Reviewed CXR images, clinically suspect persistent element of heart failure.  Getting a lot of IV additives, fluids room antibiotics.  Spironolactone 25 mg added  Restart IV furosemide  Getting potassium for K+ of 3.5.  Follow-up BMP for electrolytes and renal function.  MPI is reassuring but cannot exclude balanced ischemia with known extensive coronary calcification by prior CT scan.  Cardiac catheterization today  Microcytic anemia with negative occult blood.  Holding apixaban  Continue sotalol.  Continue rosuvastatin  All the above was discussed with the patient for of which he expressed understanding.  Discussed treatment plan with William Ott MD     Thank you for allowing me to participate in the care of this patient.     Please contact me with any questions.     Claude Billings M.D.   Cardiologist, [facility]  [phone]

## 2024-05-02 NOTE — PROGRESS NOTES
Monitor Summary  Rhythm: Sinus rhythm/sinus tracey  Rate: 48-64  Ectopy: rare PVC trig  .17 / .11 / .50

## 2024-05-02 NOTE — DIETARY
Nutrition Update:    Day 4 of admit.  Ervin Ly is a 73 y.o. male with admitting DX of Multifocal pneumonia [J18.9].  Patient being followed to optimize nutrition.    Current Diet: Just went NPO, previously on Cardiac diet. Ensure Compact sent twice daily.   Per progress notes: Cardiac catheterization procedure today   Minimal records per doc flow sheet, pt ate % of last three documented meals.  Met with pt who reports appetite improving since admission and now eating 80-90% of meals.   Pt reports dislikes hospital food and only getting supplements sporadically; pt requesting supplements be sent with all meals and provided food preferences.     Problem: Nutritional:  Goal: Achieve adequate nutritional intake  Description: Patient will consume >50% of meals  Outcome: Met.      Plan/rec:   Adjusted supplements: Ensure Compact to be sent with all meals as appropriate with diet advancement.   Noted pt preferences and communicated to kitchen.  RD to screen weekly.

## 2024-05-02 NOTE — PROGRESS NOTES
Report received from offgoing RN    Pt A&Ox4, sitting EOB. No complaints at this time.    Bed on lowest setting, call light in reach.

## 2024-05-02 NOTE — PROGRESS NOTES
[facility] Medicine Daily Progress Note    Date of Service  5/2/2024    Chief Complaint  shortness of breath     Hospital Course  Ervin Ly is a 73 y.o. male with chronic atrial fibrillation on Eliquis and sotalol with history of prior ablation, HTN, HLD, gout, GERD, new diagnosis of colon cancer (that has not yet been treated) and reactive airway disease, who presented to [facility] on 4/28/2024 due to shortness of breath and not  feeling well for the past 3 weeks with subjective fever, chills, productive sputum, and weakness. He reported traveling to [place] recently, but denied sick contact. He has been living in his  and was planning to drive from [place] to [place]. In [place] at the  park he became very weak and dizzy, he require assistance getting into his car and then proceeded to drive while he was symptomatic to [place]. Here, he was seen at urgent care due to concern of multifocal pneumonia on CXR and was hypoxic and he was sent to the ED.  On evaluation, he was requiring 2 to 4 L of oxygen by nasal cannula.  WBC was 18,400.  Sodium 134, potassium 3.3, BNP of 2655, and troponin was 25.  Chest x-ray showed bilateral perihilar and lower lobe parenchymal opacities.  Patient started on antibiotics.  Echocardiogram was obtained which showed reduced EF of 45%, with no prior history of CHF.  He was started on diuresis, and cardiology was consulted who recommended ischemic workup with MPI. Blood cultures remained negative.  Sputum cultures grew MSSA. He diuresed well.  Lipid profile was at goal. WBC count has normalized, and procalcitonin now low. Nuclear cardiac stress test showed no evidence of significant jeopardized viable myocardium or prior MI.  Cardiology gave the opinion that despite stress test negative, cannot rule out balanced ischemia, and recommended proceeding with [facility]    Interval Problem Update  5/2/2024 - I reviewed the patient's chart. There were no significant overnight events. Remains  hemodynamically stable and afebrile. Stable on 2L O2 NC.  No leukocytosis.  Hemoglobin stable.  Platelet counts remain normal.  Potassium 3.5.  Creatinine is normal.  Procalcitonin remains low at 0.05..    > I have personally seen and examined the patient today.  He states he is breathing well.  Denies any pain.  No cough.  Leg edema is better.  No nausea, vomiting, abdominal pain.      I personally reviewed all lab results mentioned above. Prior medical records from this institution and outside facilities were independently reviewed as noted. I also personally reviewed all ER physician and consultant recommendations and plans as documented above. History was independently obtained by myself. I have discussed this patient's plan of care and discharge plan at IDT rounds today with Case Management, Nursing, Nursing leadership, and other members of the IDT team.      Consultants/Specialty  Cardiology [place]    Code Status  Full Code    Disposition  The patient is not medically cleared for discharge to home or a post-acute facility.      6 clicks 24 and 24.  Anticipate discharge to home once medically cleared.  I have placed the appropriate orders for post-discharge needs.    Review of Systems  ROS     Pertinent positives/negatives as mentioned above.     A complete review of systems was personally done by me. All other systems were negative.       Physical Exam  Temp:  [36.6 °C (97.9 °F)-36.7 °C (98.1 °F)] 36.6 °C (97.9 °F)  Pulse:  [59-69] 59  Resp:  [16-17] 16  BP: (122-150)/(58-80) 122/71  SpO2:  [90 %-95 %] 92 %    Physical Exam  Vitals and nursing note reviewed. Exam conducted with a chaperone present.   Constitutional:       General: He is not in acute distress.     Appearance: Normal appearance. He is not toxic-appearing or diaphoretic.   HENT:      Head: Normocephalic and atraumatic.      Right Ear: External ear normal.      Left Ear: External ear normal.      Nose: Nose normal.      Mouth/Throat:       Mouth: Mucous membranes are moist.      Pharynx: No oropharyngeal exudate.   Eyes:      General: No scleral icterus.     Extraocular Movements: Extraocular movements intact.      Conjunctiva/sclera: Conjunctivae normal.      Pupils: Pupils are equal, round, and reactive to light.   Cardiovascular:      Rate and Rhythm: Normal rate and regular rhythm.      Pulses: Normal pulses.      Heart sounds: Normal heart sounds. No murmur heard.     No gallop.   Pulmonary:      Effort: Pulmonary effort is normal. No respiratory distress.      Breath sounds: No stridor. No wheezing, rhonchi or rales.   Chest:      Chest wall: No tenderness.   Abdominal:      General: Abdomen is flat. Bowel sounds are normal. There is no distension.      Palpations: Abdomen is soft. There is no mass.      Tenderness: There is no abdominal tenderness. There is no guarding or rebound.   Musculoskeletal:         General: No swelling or deformity. Normal range of motion.      Cervical back: Normal range of motion and neck supple.      Right lower leg: No edema.      Left lower leg: No edema.   Lymphadenopathy:      Cervical: No cervical adenopathy.   Skin:     General: Skin is warm and dry.      Capillary Refill: Capillary refill takes less than 2 seconds.      Coloration: Skin is not jaundiced.      Findings: No rash.   Neurological:      General: No focal deficit present.      Mental Status: He is alert and oriented to person, place, and time.      Cranial Nerves: No cranial nerve deficit.   Psychiatric:         Mood and Affect: Mood normal.         Behavior: Behavior normal.         Thought Content: Thought content normal.         Judgment: Judgment normal.           Fluids    Intake/Output Summary (Last 24 hours) at 5/2/2024 1133  Last data filed at 5/2/2024 1115  Gross per 24 hour   Intake 1756.06 ml   Output 1300 ml   Net 456.06 ml       Laboratory  Recent Labs     04/29/24  1317 05/01/24  0119 05/02/24  0146   WBC 15.5*  15.5* 10.1 9.0   RBC  4.30*  4.30* 4.23* 4.28*   HEMOGLOBIN 10.3*  10.3* 9.9* 9.9*   HEMATOCRIT 33.0*  33.0* 32.1* 32.8*   MCV 76.7*  76.7* 75.9* 76.6*   MCH 24.0*  24.0* 23.4* 23.1*   MCHC 31.2*  31.2* 30.8* 30.2*   RDW 52.6*  52.6* 52.0* 51.8*   PLATELETCT 348  348 369 344   MPV 8.6*  8.6* 8.6* 8.8*     Recent Labs     04/30/24  0348 05/01/24  0119 05/02/24  0146   SODIUM 137 137 137   POTASSIUM 3.3* 3.5* 3.5*   CHLORIDE 101 101 99   CO2 25 26 28   GLUCOSE 112* 104* 98   BUN 11 12 12   CREATININE 0.59 0.63 0.64   CALCIUM 8.3* 8.6 8.4*               Recent Labs     04/30/24  0348   TRIGLYCERIDE 67   HDL 26*   LDL 36       Imaging  DX-CHEST-PORTABLE (1 VIEW)   Final Result      1.  Small left pleural effusion.      2.  Increased interstitial markings in the perihilar regions consistent with pulmonary edema and/or pneumonitis.      NM-CARDIAC STRESS TEST   Final Result      EC-ECHOCARDIOGRAM COMPLETE W/ CONT   Final Result      CL-LEFT HEART CATHETERIZATION WITH POSSIBLE INTERVENTION    (Results Pending)        Assessment/Plan  * Multifocal pneumonia- (present on admission)  Assessment & Plan  - Chest x-ray showed bilateral perihilar and lower lobe parenchymal opacities.  Still requiring supplemental oxygen.  Sputum cultures growing MSSA.  -Completing course of antibiotics.  Change to oral Augmentin and continue doxycycline.  -Continue respiratory support with RT protocol, supplemental oxygen.  Keep saturations above 88%, wean as able from O2.   -WBC count has normalized, and procalcitonin remains low.        Acute HFrEF (heart failure with reduced ejection fraction) (HCC)- (present on admission)  Assessment & Plan  - New.  No known history.  Initially volume overloaded, but now appears euvolemic.  -MPI negative for ischemia, however cannot rule out balanced ischemia with known extensive coronary calcification on CT.  Proceed with cardiac catheterization.  -Cardiology following.  Appears euvolemic today.  Start Aldactone.  -Will  need to optimize GDMT.  -Monitor on telemetry.      Acute respiratory failure with hypoxia (HCC)- (present on admission)  Assessment & Plan  -Multifactorial from pneumonia and HFrEF.  Remains on 2 L of oxygen by nasal cannula.  -Continue antibiotics and diuretics.  -Further cardiac ischemic workup as above.  -Continue respiratory support with RT protocol.  Continue supplemental oxygen, keep saturation above 88% and wean as able.    QT prolongation- (present on admission)  Assessment & Plan  -On sotalol.  Repeat EKG showed QTc of 486.  -Continue to monitor on telemetry.  -Avoid QTc prolonging medications.    Elevated troponin- (present on admission)  Assessment & Plan  -Probable demand ischemia, minimally elevated and has remained flat.  MPI negative.  -Cardiology gave the opinion that despite stress test negative, cannot rule out balanced ischemia, and recommended proceeding with [facility]  -Continue statin and Eliquis.    Hypokalemia- (present on admission)  Assessment & Plan  - Potassium remains borderline at 3.5 today.  Will give another dose of 40 mEq of oral K-Dur.  Magnesium level was normal.  -Continue to monitor, BMP in the morning.    Acquired circulating anticoagulants (HCC)- (present on admission)  Assessment & Plan  - Holding Eliquis for now for [facility].    ACP (advance care planning)- (present on admission)  Assessment & Plan  -Goals of care discussion this admission, patient wishes to remain full code.    Reactive airway disease- (present on admission)  Assessment & Plan  - Not in acute exacerbation.  Continue as needed bronchodilators, along with home Singulair.  -Continue RT protocol.    Hypertension- (present on admission)  Assessment & Plan  -Maintaining good blood pressure control.  Continue sotalol.  Aldactone added.  Monitor blood pressure trend closely, continue as needed IV labetalol for significant symptomatic hypertension.  Optimize blood pressure control.    Hypothyroidism- (present on  admission)  Assessment & Plan  -Continue Synthroid.      Hyperlipidemia- (present on admission)  Assessment & Plan  -Continue statin.    Chronic a-fib (HCC)- (present on admission)  Assessment & Plan  - Maintaining rate control.  Continue sotalol.  Continue anticoagulation with Eliquis (hold for [facility]).  Continue to monitor on telemetry.         VTE prophylaxis: Eliquis, SCD    My total time spent caring for the patient on the day of the encounter was 50 minutes. This does not include time spent on separately billable procedures/tests.

## 2024-05-02 NOTE — CARE PLAN
The patient is Stable - Low risk of patient condition declining or worsening    Shift Goals  Clinical Goals: wean O2, VSS  Patient Goals: sleep  Family Goals: jerome    Progress made toward(s) clinical / shift goals:    Problem: Knowledge Deficit - COPD  Goal: Patient/significant other demonstrates understanding of disease process, utilization of the Action Plan, medications and discharge instruction  Outcome: Progressing     Problem: Risk for Infection - COPD  Goal: Patient will remain free from signs and symptoms of infection  Outcome: Progressing     Problem: Risk for Aspiration  Goal: Patient's risk for aspiration will be absent or decrease  Outcome: Progressing     Problem: Fluid Volume  Goal: Fluid volume balance will be maintained  Outcome: Progressing     Problem: Fall Risk  Goal: Patient will remain free from falls  Outcome: Progressing     Problem: Skin Integrity  Goal: Skin integrity is maintained or improved  Outcome: Progressing     Problem: Pain - Standard  Goal: Alleviation of pain or a reduction in pain to the patient’s comfort goal  Outcome: Progressing       Patient is not progressing towards the following goals:

## 2024-05-03 ENCOUNTER — APPOINTMENT (OUTPATIENT)
Dept: CARDIOLOGY | Facility: MEDICAL CENTER | Age: 74
DRG: 981 | End: 2024-05-03
Attending: NURSE PRACTITIONER
Payer: MEDICARE

## 2024-05-03 LAB
ACT BLD: 233 SEC (ref 74–137)
ACT BLD: 255 SEC (ref 74–137)
ANION GAP SERPL CALC-SCNC: 11 MMOL/L (ref 7–16)
BACTERIA BLD CULT: NORMAL
BACTERIA BLD CULT: NORMAL
BUN SERPL-MCNC: 11 MG/DL (ref 8–22)
CALCIUM SERPL-MCNC: 8.8 MG/DL (ref 8.5–10.5)
CHLORIDE SERPL-SCNC: 98 MMOL/L (ref 96–112)
CO2 SERPL-SCNC: 27 MMOL/L (ref 20–33)
CREAT SERPL-MCNC: 0.57 MG/DL (ref 0.5–1.4)
EKG IMPRESSION: NORMAL
GFR SERPLBLD CREATININE-BSD FMLA CKD-EPI: 104 ML/MIN/1.73 M 2
GLUCOSE BLD STRIP.AUTO-MCNC: 141 MG/DL (ref 65–99)
GLUCOSE SERPL-MCNC: 90 MG/DL (ref 65–99)
POTASSIUM SERPL-SCNC: 3.6 MMOL/L (ref 3.6–5.5)
SIGNIFICANT IND 70042: NORMAL
SIGNIFICANT IND 70042: NORMAL
SITE SITE: NORMAL
SITE SITE: NORMAL
SODIUM SERPL-SCNC: 136 MMOL/L (ref 135–145)
SOURCE SOURCE: NORMAL
SOURCE SOURCE: NORMAL

## 2024-05-03 PROCEDURE — B2111ZZ FLUOROSCOPY OF MULTIPLE CORONARY ARTERIES USING LOW OSMOLAR CONTRAST: ICD-10-PCS | Performed by: INTERNAL MEDICINE

## 2024-05-03 PROCEDURE — 93454 CORONARY ARTERY ANGIO S&I: CPT | Mod: 26,59 | Performed by: INTERNAL MEDICINE

## 2024-05-03 PROCEDURE — 93567 NJX CAR CTH SPRVLV AORTGRPHY: CPT | Performed by: INTERNAL MEDICINE

## 2024-05-03 PROCEDURE — 027135Z DILATION OF CORONARY ARTERY, TWO ARTERIES WITH TWO DRUG-ELUTING INTRALUMINAL DEVICES, PERCUTANEOUS APPROACH: ICD-10-PCS | Performed by: INTERNAL MEDICINE

## 2024-05-03 PROCEDURE — 4A023N7 MEASUREMENT OF CARDIAC SAMPLING AND PRESSURE, LEFT HEART, PERCUTANEOUS APPROACH: ICD-10-PCS | Performed by: INTERNAL MEDICINE

## 2024-05-03 PROCEDURE — 93010 ELECTROCARDIOGRAM REPORT: CPT | Performed by: INTERNAL MEDICINE

## 2024-05-03 PROCEDURE — 99232 SBSQ HOSP IP/OBS MODERATE 35: CPT | Performed by: INTERNAL MEDICINE

## 2024-05-03 PROCEDURE — B3101ZZ FLUOROSCOPY OF THORACIC AORTA USING LOW OSMOLAR CONTRAST: ICD-10-PCS | Performed by: INTERNAL MEDICINE

## 2024-05-03 PROCEDURE — 99223 1ST HOSP IP/OBS HIGH 75: CPT | Mod: 25 | Performed by: INTERNAL MEDICINE

## 2024-05-03 PROCEDURE — 99152 MOD SED SAME PHYS/QHP 5/>YRS: CPT | Performed by: INTERNAL MEDICINE

## 2024-05-03 PROCEDURE — 92928 PRQ TCAT PLMT NTRAC ST 1 LES: CPT | Mod: LD | Performed by: INTERNAL MEDICINE

## 2024-05-03 RX ORDER — VERAPAMIL HYDROCHLORIDE 2.5 MG/ML
INJECTION, SOLUTION INTRAVENOUS
Status: COMPLETED
Start: 2024-05-03 | End: 2024-05-03

## 2024-05-03 RX ORDER — HEPARIN SODIUM 1000 [USP'U]/ML
INJECTION, SOLUTION INTRAVENOUS; SUBCUTANEOUS
Status: COMPLETED
Start: 2024-05-03 | End: 2024-05-03

## 2024-05-03 RX ORDER — HEPARIN SODIUM 200 [USP'U]/100ML
INJECTION, SOLUTION INTRAVENOUS
Status: COMPLETED
Start: 2024-05-03 | End: 2024-05-03

## 2024-05-03 RX ORDER — ASPIRIN 81 MG/1
TABLET, CHEWABLE ORAL
Status: COMPLETED
Start: 2024-05-03 | End: 2024-05-03

## 2024-05-03 RX ORDER — CLOPIDOGREL 300 MG/1
TABLET, FILM COATED ORAL
Status: COMPLETED
Start: 2024-05-03 | End: 2024-05-03

## 2024-05-03 RX ORDER — MIDAZOLAM HYDROCHLORIDE 1 MG/ML
INJECTION INTRAMUSCULAR; INTRAVENOUS
Status: COMPLETED
Start: 2024-05-03 | End: 2024-05-03

## 2024-05-03 RX ORDER — SODIUM CHLORIDE 9 MG/ML
3 INJECTION, SOLUTION INTRAVENOUS CONTINUOUS
Status: ACTIVE | OUTPATIENT
Start: 2024-05-03 | End: 2024-05-03

## 2024-05-03 RX ORDER — LIDOCAINE HYDROCHLORIDE 20 MG/ML
INJECTION, SOLUTION INFILTRATION; PERINEURAL
Status: COMPLETED
Start: 2024-05-03 | End: 2024-05-03

## 2024-05-03 RX ORDER — ALPRAZOLAM 0.25 MG/1
0.25 TABLET ORAL 2 TIMES DAILY PRN
Status: DISCONTINUED | OUTPATIENT
Start: 2024-05-03 | End: 2024-05-04 | Stop reason: HOSPADM

## 2024-05-03 RX ORDER — ASPIRIN 81 MG/1
81 TABLET ORAL DAILY
Status: DISCONTINUED | OUTPATIENT
Start: 2024-05-04 | End: 2024-05-04 | Stop reason: HOSPADM

## 2024-05-03 RX ORDER — CLOPIDOGREL 300 MG/1
600 TABLET, FILM COATED ORAL ONCE
Status: DISCONTINUED | OUTPATIENT
Start: 2024-05-03 | End: 2024-05-03

## 2024-05-03 RX ORDER — HYDROXYZINE HYDROCHLORIDE 25 MG/1
25 TABLET, FILM COATED ORAL 3 TIMES DAILY PRN
Status: DISCONTINUED | OUTPATIENT
Start: 2024-05-03 | End: 2024-05-03

## 2024-05-03 RX ORDER — CLOPIDOGREL BISULFATE 75 MG/1
75 TABLET ORAL DAILY
Status: DISCONTINUED | OUTPATIENT
Start: 2024-05-04 | End: 2024-05-04 | Stop reason: HOSPADM

## 2024-05-03 RX ADMIN — ACETAMINOPHEN 650 MG: 325 TABLET, FILM COATED ORAL at 21:10

## 2024-05-03 RX ADMIN — SOTALOL HYDROCHLORIDE 80 MG: 80 TABLET ORAL at 04:27

## 2024-05-03 RX ADMIN — FENTANYL CITRATE 25 MCG: 50 INJECTION, SOLUTION INTRAMUSCULAR; INTRAVENOUS at 11:18

## 2024-05-03 RX ADMIN — VERAPAMIL HYDROCHLORIDE 2.5 MG: 2.5 INJECTION, SOLUTION INTRAVENOUS at 10:03

## 2024-05-03 RX ADMIN — SOTALOL HYDROCHLORIDE 80 MG: 80 TABLET ORAL at 17:16

## 2024-05-03 RX ADMIN — HEPARIN SODIUM: 1000 INJECTION INTRAVENOUS; SUBCUTANEOUS at 10:58

## 2024-05-03 RX ADMIN — FUROSEMIDE 20 MG: 10 INJECTION INTRAMUSCULAR; INTRAVENOUS at 04:26

## 2024-05-03 RX ADMIN — ALLOPURINOL 300 MG: 300 TABLET ORAL at 04:28

## 2024-05-03 RX ADMIN — GUAIFENESIN 1200 MG: 600 TABLET, EXTENDED RELEASE ORAL at 04:34

## 2024-05-03 RX ADMIN — NITROGLYCERIN 10 ML: 20 INJECTION INTRAVENOUS at 10:03

## 2024-05-03 RX ADMIN — CLOPIDOGREL BISULFATE 600 MG: 300 TABLET, FILM COATED ORAL at 11:18

## 2024-05-03 RX ADMIN — SENNOSIDES AND DOCUSATE SODIUM 2 TABLET: 50; 8.6 TABLET ORAL at 17:17

## 2024-05-03 RX ADMIN — OMEPRAZOLE 20 MG: 20 CAPSULE, DELAYED RELEASE ORAL at 04:28

## 2024-05-03 RX ADMIN — ALPRAZOLAM 0.25 MG: 0.25 TABLET ORAL at 17:17

## 2024-05-03 RX ADMIN — SPIRONOLACTONE 25 MG: 25 TABLET ORAL at 04:27

## 2024-05-03 RX ADMIN — GUAIFENESIN 1200 MG: 600 TABLET, EXTENDED RELEASE ORAL at 17:16

## 2024-05-03 RX ADMIN — ASPIRIN 324 MG: 81 TABLET, CHEWABLE ORAL at 10:17

## 2024-05-03 RX ADMIN — HEPARIN SODIUM: 1000 INJECTION, SOLUTION INTRAVENOUS; SUBCUTANEOUS at 10:03

## 2024-05-03 RX ADMIN — FENTANYL CITRATE 100 MCG: 50 INJECTION, SOLUTION INTRAMUSCULAR; INTRAVENOUS at 10:46

## 2024-05-03 RX ADMIN — OMEPRAZOLE 20 MG: 20 CAPSULE, DELAYED RELEASE ORAL at 17:17

## 2024-05-03 RX ADMIN — HEPARIN SODIUM 2000 UNITS: 200 INJECTION, SOLUTION INTRAVENOUS at 10:04

## 2024-05-03 RX ADMIN — SODIUM CHLORIDE 3 ML/KG/HR: 9 INJECTION, SOLUTION INTRAVENOUS at 12:54

## 2024-05-03 RX ADMIN — MONTELUKAST 10 MG: 10 TABLET, FILM COATED ORAL at 04:27

## 2024-05-03 RX ADMIN — APIXABAN 5 MG: 5 TABLET, FILM COATED ORAL at 17:16

## 2024-05-03 RX ADMIN — IOHEXOL 169 ML: 350 INJECTION, SOLUTION INTRAVENOUS at 11:18

## 2024-05-03 RX ADMIN — Medication 2000 UNITS: at 04:27

## 2024-05-03 RX ADMIN — MIDAZOLAM HYDROCHLORIDE 2 MG: 2 INJECTION, SOLUTION INTRAMUSCULAR; INTRAVENOUS at 10:46

## 2024-05-03 RX ADMIN — ROSUVASTATIN 10 MG: 10 TABLET, FILM COATED ORAL at 17:17

## 2024-05-03 RX ADMIN — DOXYCYCLINE 100 MG: 100 TABLET, FILM COATED ORAL at 04:26

## 2024-05-03 RX ADMIN — LIDOCAINE HYDROCHLORIDE: 20 INJECTION, SOLUTION INFILTRATION; PERINEURAL at 10:03

## 2024-05-03 RX ADMIN — LEVOTHYROXINE SODIUM 200 MCG: 0.1 TABLET ORAL at 04:26

## 2024-05-03 ASSESSMENT — PAIN DESCRIPTION - PAIN TYPE: TYPE: ACUTE PAIN

## 2024-05-03 ASSESSMENT — FIBROSIS 4 INDEX: FIB4 SCORE: 1.48

## 2024-05-03 NOTE — CARE PLAN
Problem: Respiratory  Goal: Patient will achieve/maintain optimum respiratory ventilation and gas exchange  Description: Target End Date:  Prior to discharge or change in level of care    Document on Assessment flowsheet    1.  Assess and monitor rate, rhythm, depth and effort of respiration  2.  Breath sounds assessed qshift and/or as needed  3.  Assess O2 saturation, administer/titrate oxygen as ordered  4.  Position patient for maximum ventilatory efficiency  5.  Turn, cough, and deep breath with splinting to improve effectiveness  6.  Collaborate with RT to administer medication/treatments per order  7.  Encourage use of incentive spirometer and encourage patient to cough after use and utilize splinting techniques if applicable  8.  Airway suctioning  9.  Monitor sputum production for changes in color, consistency and frequency  10. Perform frequent oral hygiene  11. Alternate physical activity with rest periods  Outcome: Progressing   The patient is Watcher - Medium risk of patient condition declining or worsening    Shift Goals  Clinical Goals: wean O2  Patient Goals: sleep  Family Goals: jerome    Progress made toward(s) clinical / shift goals:  Pt is now on 1 L of oxygen weaned down from 3L upon admission    Patient is not progressing towards the following goals:

## 2024-05-03 NOTE — PROGRESS NOTES
Bedside report received from off going RN/tech: Charmaine assumed care of patient.     Fall Risk Score: LOW RISK  Fall risk interventions in place: Provide patient/family education based on risk assessment  Bed type: Regular (Sam Score less than 17 interventions in place)  Patient on cardiac monitor: Yes  IVF/IV medications: Not Applicable   Oxygen: Room Air  Bedside sitter: Not Applicable   Isolation: Not applicable

## 2024-05-03 NOTE — CARE PLAN
Problem: Knowledge Deficit - Standard  Goal: Patient and family/care givers will demonstrate understanding of plan of care, disease process/condition, diagnostic tests and medications  Description: Target End Date:  1-3 days or as soon as patient condition allows    Document in Patient Education    1.  Patient and family/caregiver oriented to unit, equipment, visitation policy and means for communicating concern  2.  Complete/review Learning Assessment  3.  Assess knowledge level of disease process/condition, treatment plan, diagnostic tests and medications  4.  Explain disease process/condition, treatment plan, diagnostic tests and medications  Outcome: Progressing     Problem: Nutrition - Advanced  Goal: Patient will display progressive weight gain toward goal have adequate food and fluid intake  Description: Target End Date:  Prior to discharge or change in level of care    1.  Daily weights  2.  Monitor dietary intake  3.  Promote systemic fluid hydration within cardiac tolerance  4.  Albumin and PreAlbumin per provider order  5.  Enteral and parenteral nutrition per provider order  6.  Calorie count  7.  Provide oral care  8.  Collaborate with Clinical Dietician  9.  Consider Speech Therapy consult for swallowing difficulties  10. Administer supplemental oxygen during meals as indicated  Outcome: Progressing   The patient is Stable - Low risk of patient condition declining or worsening    Shift Goals  Clinical Goals: wean O2  Patient Goals: sleep  Family Goals: jerome    Progress made toward(s) clinical / shift goals:      Patient is not progressing towards the following goals:

## 2024-05-03 NOTE — PROGRESS NOTES
Monitor Summary:   Rhythm: SR/SB  Rate: 56-76  Measurement: .16/.10/.48  Ectopy: pvc, trig

## 2024-05-03 NOTE — PROCEDURES
Cardiac Catheterization and Percutaneous Intervention Procedure Report    5/3/2024    Referring MD:     Indication for procedure: Ischemic cardiomyopathy, congestive heart failure, coronary artery disease by CT scan    Procedures:  Coronary angiogram  Left heart catheterization  Aortic root angiogram  Placement of 3.0 x 15 mm Tariq drug-eluting stent in distal LAD.  Placement of 2.5 x 12 mm Snyder drug-eluting stent in the proximal marginal branch.    Final diagnosis:   Two vessel coronary artery disease, successful PCI    Recommendations: Guideline directed medical therapy and risk factor management      Coronary arteriograms:  Left main: normal  Left anterior descending: Severely calcified in the proximal portion but no significant disease.  Distal LAD has focal 80% stenosis.  Left circumflex: Large vessel, first marginal branch has 80% stenosis in the proximal portion.  Second marginal branch is free of significant disease  Right coronary: 20 to 30% stenosis in midportion, dominant    Left Heart Catheterization:  Left Ventriculogram: Not performed  Left Ventricular EDP: 9 mm Hg   Aortic Valve Gradient: No significant AV gradient noted  Aortic root angiogram was performed showed normal sized ascending aorta    Procedure details:  Ervin Ly was brought to the cardiac catheterization lab where the right wrist was prepped and draped in the usual manner for cardiac catheterization.  Timeout was performed.  The area was anesthetized with lidocaine and a 5/6 FR sheath was inserted into the right radial artery without difficulty. A #4.5 left Aldo catheter was advanced to the ostia of the Left coronary artery and arteriograms were recorded.   A #4 right Aldo catheter was advanced to the ostia of the right coronary artery and arteriograms were recorded. Aortic valve was crossed using pigtail catheter left heart catheterization was performed.  He needed a long catheters to engage coronary arteries, I  wanted to rule out ascending aortic aneurysm, pigtail catheter was used to perform aortic root angiogram.  Patient underwent percutaneous coronary revascularization as outlined below.  At the completion of the case the sheath was removed and hemostasis achieved utilizing a radial compression band .  Patient was pain-free and hemodynamically stable at the completion of the case.  There were no apparent complications.    Interventional Procedure:     EBU 4.0 guide catheter was used to engage left main.  Distal left anterior descending artery crossed using run-through wire.  Lesion was 80% stenosis, YENY-3 flow, 12 mm in length.  3.0 x 15 mm Comstock drug-eluting stent was placed in distal LAD, postdilated using 3.0 x 12 mm NC balloon with excellent result.  Post 0% stenosis, YENY-3 flow.  Then run-through wire was advanced to first marginal artery, which has 80% stenosis, lesion length was 8 mm.  A 2.5 x 12 mm Comstock drug-eluting stent was placed in proximal marginal artery with excellent result, post 0% stenosis, YENY-3 flow.        Anticoagulant: Heparin  Antiplatelet: Clopidogrel  EBL <25 cc  Complications: none  Specimens: none  Contrast: 169 cc  Fluorotime : see cath lab flowsheet      Sedation: I supervised moderate sedation over a trained independent observer.    Sedation start time: 10:27  End time: 11:15        Electronically signed by   Denzel Garcia M.D., FLETCHER  Interventional cardiologist  5/3/2024  2:04 PM

## 2024-05-03 NOTE — CONSULTS
Interventional cardiology consultation requested by Dr. Billings for this patient with low ejection fraction, congestive heart failure, coronary artery calcification      CC:   Chief Complaint   Patient presents with    Shortness of Breath     Patient sent from urgent care for bilateral lobe pneumonia reading on XRAY. Patient reports ablation on 3/4/2024. Reports cough, shortness or breath, and mucus production since ablation. Denies chest pain and fever.       HPI: 73-year-old male patient with recent A-fib ablation, presented with decompensated heart failure, found to have LV dysfunction, mild aortic stenosis.  He has significant coronary artery calcification on the CT scan.  He is referred for coronary angiogram with possible PCI for ischemia evaluation.    Medications / Drug list prior to admission:  No current facility-administered medications on file prior to encounter.     Current Outpatient Medications on File Prior to Encounter   Medication Sig Dispense Refill    albuterol (PROAIR HFA) 108 (90 Base) MCG/ACT Aero Soln inhalation aerosol Inhale 1-2 Puffs every four hours as needed for Shortness of Breath.      allopurinol (ZYLOPRIM) 300 MG Tab Take 1 tablet every day by oral route as directed for 90 days.      apixaban (ELIQUIS) 5mg Tab Take 1 Tablet by mouth 2 times a day.      Cholecalciferol (VITAMIN D3) 50 MCG (2000 UT) Tab Take 2,000 Units by mouth every day.      docusate sodium 100 MG Cap Take 100 mg by mouth every day.      NEXIUM 40 MG delayed-release capsule Take 40 mg by mouth every day.      levothyroxine (SYNTHROID) 200 MCG Tab Take 200 mcg by mouth every day.      metoprolol tartrate (LOPRESSOR) 25 MG Tab Take 25 mg by mouth 2 times a day.      montelukast (SINGULAIR) 10 MG Tab Take 10 mg by mouth every day.      rosuvastatin (CRESTOR) 10 MG Tab Take 1 tablet every day by oral route in the evening for 90 days.      sotalol (BETAPACE) 80 MG Tab Take 1 Tablet by mouth 2 times a day.          Current list of administered Medications:    Current Facility-Administered Medications:     FENTANYL CITRATE (PF) 0.05 MG/ML INJ SOLN (WRAPPED), , , ,     MIDAZOLAM HCL 1 MG/ML INJ SOLN (WRAPPER), , , ,     spironolactone (Aldactone) tablet 25 mg, 25 mg, Oral, Q DAY, Claude Billings M.D., 25 mg at 05/03/24 0427    furosemide (Lasix) injection 20 mg, 20 mg, Intravenous, DAILY, Claude Billings M.D., 20 mg at 05/03/24 0426    guaiFENesin ER (Mucinex) tablet 1,200 mg, 1,200 mg, Oral, Q12HRS, William Ott M.D., 1,200 mg at 05/03/24 0434    benzonatate (Tessalon) capsule 100 mg, 100 mg, Oral, TID PRN, William Ott M.D.    menthol (Halls) lozenge 1 Lozenge, 1 Lozenge, Oral, Q2HRS PRN, RHIANNON Oliva, 1 Lozenge at 05/02/24 2130    albuterol inhaler 2 Puff, 2 Puff, Inhalation, Q4H PRN (RT), Kt Woods M.D.    allopurinol (Zyloprim) tablet 300 mg, 300 mg, Oral, DAILY, Kt Woods M.D., 300 mg at 05/03/24 0428    [Held by provider] apixaban (Eliquis) tablet 5 mg, 5 mg, Oral, BID, Kt Woods M.D., 5 mg at 04/30/24 0545    vitamin D3 (Cholecalciferol) tablet 2,000 Units, 2,000 Units, Oral, DAILY, Kt Woods M.D., 2,000 Units at 05/03/24 0427    levothyroxine (Synthroid) tablet 200 mcg, 200 mcg, Oral, DAILY, Kt Woods M.D., 200 mcg at 05/03/24 0426    montelukast (Singulair) tablet 10 mg, 10 mg, Oral, DAILY, Kt Woods M.D., 10 mg at 05/03/24 0427    rosuvastatin (Crestor) tablet 10 mg, 10 mg, Oral, Q EVENING, Kt Woods M.D., 10 mg at 05/02/24 1801    sotalol (Betapace) tablet 80 mg, 80 mg, Oral, BID, Kt Woods M.D., 80 mg at 05/03/24 0427    Respiratory Therapy Consult, , Nebulization, Continuous RT, Kt Woods M.D.    ipratropium-albuterol (DUONEB) nebulizer solution, 3 mL, Nebulization, Q2HRS PRN (RT), Kt Woods M.D.    acetaminophen (Tylenol) tablet 650 mg, 650 mg, Oral, Q6HRS PRN, Kt oWods M.D., 650 mg at 05/01/24 0103    labetalol (Normodyne/Trandate)  injection 10 mg, 10 mg, Intravenous, Q4HRS PRN, Kt Woods M.D.    guaiFENesin dextromethorphan (Robitussin DM) 100-10 MG/5ML syrup 10 mL, 10 mL, Oral, Q6HRS PRN, Kt Woods M.D., 10 mL at 05/01/24 2155    senna-docusate (Pericolace Or Senokot S) 8.6-50 MG per tablet 2 Tablet, 2 Tablet, Oral, Q EVENING, 2 Tablet at 05/02/24 1801 **AND** polyethylene glycol/lytes (Miralax) Packet 1 Packet, 1 Packet, Oral, QDAY PRN, Kt Woods M.D.    omeprazole (PriLOSEC) capsule 20 mg, 20 mg, Oral, BID, Kt Woods M.D., 20 mg at 05/03/24 0428    No past medical history on file.    No past surgical history on file.    No family history on file.  Patient family history was personally reviewed, no pertinent family history to current presentation    Social History     Tobacco Use    Smoking status: Never     Passive exposure: Never    Smokeless tobacco: Current     Types: Chew   Vaping Use    Vaping Use: Never used   Substance Use Topics    Alcohol use: Not Currently    Drug use: Never       ALLERGIES:  No Known Allergies    Review of systems:  A complete review of symptoms was reviewed with patient. This is reviewed in H&P and PMH. ALL OTHERS reviewed and negative    Physical exam:  Patient Vitals for the past 24 hrs:   BP Temp Temp src Pulse Resp SpO2 Weight   05/03/24 0818 93/53 36.4 °C (97.5 °F) Temporal 61 16 90 % --   05/03/24 0350 125/63 -- Temporal 63 16 90 % 101 kg (221 lb 12.5 oz)   05/02/24 2345 -- 36.6 °C (97.8 °F) Temporal 73 18 90 % --   05/02/24 1942 131/69 37.2 °C (99 °F) Temporal 62 18 90 % --   05/02/24 1603 118/72 36.8 °C (98.2 °F) Temporal 70 16 92 % --   05/02/24 1152 109/58 36.6 °C (97.9 °F) Temporal (!) 57 16 93 % --     General: No acute distress.   EYES: no jaundice  HEENT: OP clear   Neck:  No JVD.   CVS:   RRR. S1 + S2. No M/R/G  Resp: Normal respiratory effort, CTAB. No wheezing or crackles/rhonchi.  Abdomen: Soft, ND,  Skin: Grossly nothing acute no obvious rashes  Neurological: Alert, Moves all  extremities  Extremities:   no edema. No cyanosis.       Data:  Laboratory studies personally reviewed by me:  Recent Results (from the past 24 hour(s))   Basic Metabolic Panel    Collection Time: 05/03/24  4:10 AM   Result Value Ref Range    Sodium 136 135 - 145 mmol/L    Potassium 3.6 3.6 - 5.5 mmol/L    Chloride 98 96 - 112 mmol/L    Co2 27 20 - 33 mmol/L    Glucose 90 65 - 99 mg/dL    Bun 11 8 - 22 mg/dL    Creatinine 0.57 0.50 - 1.40 mg/dL    Calcium 8.8 8.5 - 10.5 mg/dL    Anion Gap 11.0 7.0 - 16.0   ESTIMATED GFR    Collection Time: 05/03/24  4:10 AM   Result Value Ref Range    GFR (CKD-EPI) 104 >60 mL/min/1.73 m 2       Imaging:  DX-CHEST-PORTABLE (1 VIEW)   Final Result      1.  Small left pleural effusion.      2.  Increased interstitial markings in the perihilar regions consistent with pulmonary edema and/or pneumonitis.      NM-CARDIAC STRESS TEST   Final Result      EC-ECHOCARDIOGRAM COMPLETE W/ CONT   Final Result      CL-LEFT HEART CATHETERIZATION WITH POSSIBLE INTERVENTION    (Results Pending)   CL-LEFT HEART CATHETERIZATION WITH POSSIBLE INTERVENTION    (Results Pending)           EKG tracings personally reviewed by me sinus rhythm, LVH    Echocardiogram 4/29/2024 reviewed, independently interpreted, mildly reduced left ventricular function, global hypokinesis, mild aortic valve stenosis    All pertinent features of laboratory and imaging reviewed including primary images where applicable      Principal Problem:    Multifocal pneumonia (POA: Yes)  Active Problems:    Acute respiratory failure with hypoxia (HCC) (POA: Yes)    Hypokalemia (POA: Yes)    Chronic a-fib (HCC) (POA: Yes)    Hyperlipidemia (POA: Yes)    Hypothyroidism (POA: Yes)    Hypertension (POA: Yes)    Reactive airway disease (POA: Yes)    Elevated troponin (POA: Yes)    ACP (advance care planning) (POA: Yes)    Acute HFrEF (heart failure with reduced ejection fraction) (HCC) (POA: Yes)    QT prolongation (POA: Yes)    Acquired  circulating anticoagulants (HCC) (POA: Yes)    Severe protein-calorie malnutrition (HCC) (POA: Yes)  Resolved Problems:    * No resolved hospital problems. *      Assessment / Plan:  73-year-old male patient with atrial fibrillation s/p ablation, sotalol therapy, coronary artery calcification, mild aortic stenosis, pneumonia, decompensated congestive heart failure referred for coronary angiogram with possible PCI.  His nuclear stress test did not show ischemia but given his coronary artery calcifications, three-vessel coronary artery disease is possibility.  Given his acute decompensated heart failure, shortness of breath, LV dysfunction it is reasonable to proceed with coronary angiogram with possible PCI.  Risk benefits discussed in detail.  Small chance of serious risk of major stroke, death discussed    Patient understood the risks and benefits, like to proceed.    Case discussed with Dr. Billings.    No future appointments.    It is my pleasure to participate in the care of Mr. Ly.  Please do not hesitate to contact me with questions or concerns.    Denzel Garcia M.D.    5/3/2024

## 2024-05-03 NOTE — PROGRESS NOTES
[facility] Medicine Daily Progress Note    Date of Service  5/3/2024    Chief Complaint  shortness of breath     Hospital Course  Ervin Ly is a 73 y.o. male with chronic atrial fibrillation on Eliquis and sotalol with history of prior ablation, HTN, HLD, gout, GERD, new diagnosis of colon cancer (that has not yet been treated) and reactive airway disease, who presented to [facility] on 4/28/2024 due to shortness of breath and not  feeling well for the past 3 weeks with subjective fever, chills, productive sputum, and weakness. He reported traveling to [place] recently, but denied sick contact. He has been living in his  and was planning to drive from [place] to [place]. In [place] at the  park he became very weak and dizzy, he require assistance getting into his car and then proceeded to drive while he was symptomatic to [facility]. Here, he was seen at urgent care due to concern of multifocal pneumonia on CXR and was hypoxic and he was sent to the ED.  On evaluation, he was requiring 2 to 4 L of oxygen by nasal cannula.  WBC was 18,400.  Sodium 134, potassium 3.3, BNP of 2655, and troponin was 25.  Chest x-ray showed bilateral perihilar and lower lobe parenchymal opacities.  Patient started on antibiotics.  Echocardiogram was obtained which showed reduced EF of 45%, with no prior history of CHF.  He was started on diuresis, and cardiology was consulted who recommended ischemic workup with MPI. Blood cultures remained negative.  Sputum cultures grew MSSA. He diuresed well.  Lipid profile was at goal. WBC count has normalized, and procalcitonin now low.  He completed course of antibiotics.  Nuclear cardiac stress test showed no evidence of significant jeopardized viable myocardium or prior MI.  Cardiology gave the opinion that despite stress test negative, cannot rule out balanced ischemia, and recommended proceeding with C.  He is started on Aldactone.    Interval Problem Update  5/3/2024 - I reviewed the  patient's chart today. Uneventful night. VSS. Afebrile. Saturating 88 to 90% on room air.  Potassium is normalized.  Rest of electrolytes and renal function are normal.  Cath Lab was not able to get to him for LHC yesterday, plan for LHC today.    > I have personally seen and examined the patient today.  No shortness of breath.  Leg edema has improved.  No cough.  No chest pains.  No nausea or vomiting.  No GI complaints.    I personally reviewed all lab results mentioned above. Prior medical records from this institution and outside facilities were independently reviewed as noted. I also personally reviewed all ER physician and consultant recommendations and plans as documented above. History was independently obtained by myself. I have discussed this patient's plan of care and discharge plan at IDT rounds today with Case Management, Nursing, Nursing leadership, and other members of the IDT team.      Consultants/Specialty  Cardiology [place]    Code Status  Full Code    Disposition  The patient is not medically cleared for discharge to home or a post-acute facility.      6 clicks 24 and 24.  Anticipate discharge to home once medically cleared. May need home O2.   I have placed the appropriate orders for post-discharge needs.    Review of Systems  ROS     Pertinent positives/negatives as mentioned above.     A complete review of systems was personally done by me. All other systems were negative.       Physical Exam  Temp:  [36.4 °C (97.5 °F)-37.2 °C (99 °F)] 36.4 °C (97.5 °F)  Pulse:  [57-73] 61  Resp:  [16-18] 16  BP: ()/(53-72) 93/53  SpO2:  [90 %-93 %] 90 %    Physical Exam  Vitals and nursing note reviewed. Exam conducted with a chaperone present.   Constitutional:       General: He is not in acute distress.     Appearance: Normal appearance. He is not toxic-appearing or diaphoretic.   HENT:      Head: Normocephalic and atraumatic.      Right Ear: External ear normal.      Left Ear: External ear  normal.      Nose: Nose normal.      Mouth/Throat:      Mouth: Mucous membranes are moist.      Pharynx: No oropharyngeal exudate.   Eyes:      General: No scleral icterus.     Extraocular Movements: Extraocular movements intact.      Conjunctiva/sclera: Conjunctivae normal.      Pupils: Pupils are equal, round, and reactive to light.   Cardiovascular:      Rate and Rhythm: Normal rate and regular rhythm.      Pulses: Normal pulses.      Heart sounds: Normal heart sounds. No murmur heard.     No gallop.   Pulmonary:      Effort: Pulmonary effort is normal. No respiratory distress.      Breath sounds: No stridor. No wheezing, rhonchi or rales.   Chest:      Chest wall: No tenderness.   Abdominal:      General: Abdomen is flat. Bowel sounds are normal. There is no distension.      Palpations: Abdomen is soft. There is no mass.      Tenderness: There is no abdominal tenderness. There is no guarding or rebound.   Musculoskeletal:         General: No swelling or deformity. Normal range of motion.      Cervical back: Normal range of motion and neck supple.      Right lower leg: No edema.      Left lower leg: No edema.   Lymphadenopathy:      Cervical: No cervical adenopathy.   Skin:     General: Skin is warm and dry.      Capillary Refill: Capillary refill takes less than 2 seconds.      Coloration: Skin is not jaundiced.      Findings: No rash.   Neurological:      General: No focal deficit present.      Mental Status: He is alert and oriented to person, place, and time.      Cranial Nerves: No cranial nerve deficit.   Psychiatric:         Mood and Affect: Mood normal.         Behavior: Behavior normal.         Thought Content: Thought content normal.         Judgment: Judgment normal.       I have performed the physical examination today 5/3/2024.  In review of yesterday's note, there are no new changes except as documented above.      Fluids    Intake/Output Summary (Last 24 hours) at 5/3/2024 1140  Last data filed at  5/3/2024 0818  Gross per 24 hour   Intake 200 ml   Output 1700 ml   Net -1500 ml       Laboratory  Recent Labs     05/01/24  0119 05/02/24  0146   WBC 10.1 9.0   RBC 4.23* 4.28*   HEMOGLOBIN 9.9* 9.9*   HEMATOCRIT 32.1* 32.8*   MCV 75.9* 76.6*   MCH 23.4* 23.1*   MCHC 30.8* 30.2*   RDW 52.0* 51.8*   PLATELETCT 369 344   MPV 8.6* 8.8*     Recent Labs     05/01/24  0119 05/02/24  0146 05/03/24  0410   SODIUM 137 137 136   POTASSIUM 3.5* 3.5* 3.6   CHLORIDE 101 99 98   CO2 26 28 27   GLUCOSE 104* 98 90   BUN 12 12 11   CREATININE 0.63 0.64 0.57   CALCIUM 8.6 8.4* 8.8                       Imaging  DX-CHEST-PORTABLE (1 VIEW)   Final Result      1.  Small left pleural effusion.      2.  Increased interstitial markings in the perihilar regions consistent with pulmonary edema and/or pneumonitis.      NM-CARDIAC STRESS TEST   Final Result      EC-ECHOCARDIOGRAM COMPLETE W/ CONT   Final Result      CL-LEFT HEART CATHETERIZATION WITH POSSIBLE INTERVENTION    (Results Pending)   CL-LEFT HEART CATHETERIZATION WITH POSSIBLE INTERVENTION    (Results Pending)        Assessment/Plan  * Multifocal pneumonia- (present on admission)  Assessment & Plan  - Chest x-ray showed bilateral perihilar and lower lobe parenchymal opacities.  Still requiring supplemental oxygen.  Sputum cultures grew MSSA.  -Completed course of antibiotics.    -Continue respiratory support with RT protocol, supplemental oxygen.  Keep saturations above 88%, wean as able from O2.   -WBC count has normalized, and procalcitonin remains low.        Acute HFrEF (heart failure with reduced ejection fraction) ([facility])- (present on admission)  Assessment & Plan  - New.  No known history.  Initially volume overloaded, but now appears near euvolemia.  -MPI negative for ischemia, however cannot rule out balanced ischemia with known extensive coronary calcification on CT.  Proceed with cardiac catheterization.  -Cardiology following.  Continue IV Lasix 20 mg daily.  Continue  Aldactone.  -Will need to optimize GDMT.  -Monitor on telemetry.      Acute respiratory failure with hypoxia (HCC)- (present on admission)  Assessment & Plan  -Multifactorial from pneumonia and HFrEF.  Remains on 1 L of oxygen by nasal cannula.  -Continue antibiotics and diuretics.  -Further cardiac ischemic workup as above.  -Continue respiratory support with RT protocol.  Continue supplemental oxygen, keep saturation above 88% and wean as able.    QT prolongation- (present on admission)  Assessment & Plan  -On sotalol.  Repeat EKG showed QTc of 486.  -Continue to monitor on telemetry.  -Avoid QTc prolonging medications.    Elevated troponin- (present on admission)  Assessment & Plan  -Probable demand ischemia, minimally elevated and has remained flat.  MPI negative.  -Cardiology gave the opinion that despite stress test negative, cannot rule out balanced ischemia, and recommended proceeding with [facility]  -Continue statin and Eliquis.    Hypokalemia- (present on admission)  Assessment & Plan  - Potassium WNLtoday.  Magnesium level was normal.  -Continue to monitor specially with IV diuretics. BMP in the morning.    Acquired circulating anticoagulants (HCC)- (present on admission)  Assessment & Plan  - Holding Eliquis for now for [facility].    ACP (advance care planning)- (present on admission)  Assessment & Plan  -Goals of care discussion this admission, patient wishes to remain full code.    Reactive airway disease- (present on admission)  Assessment & Plan  - Not in acute exacerbation.  Continue as needed bronchodilators, along with home Singulair.  -Continue RT protocol.    Hypertension- (present on admission)  Assessment & Plan  -Maintaining good blood pressure control.  Continue sotalol and aldactone.  Monitor blood pressure trend closely, continue as needed IV labetalol for significant symptomatic hypertension.  Optimize blood pressure control.    Hypothyroidism- (present on admission)  Assessment & Plan  -Continue  Synthroid.      Hyperlipidemia- (present on admission)  Assessment & Plan  -Continue statin.    Chronic a-fib (HCC)- (present on admission)  Assessment & Plan  - Maintaining rate control.  Continue sotalol.  Continue anticoagulation with Eliquis (hold for [facility]).  Continue to monitor on telemetry.         VTE prophylaxis: Eliquis, SCD    My total time spent caring for the patient on the day of this encounter (5/3/2024) was 50 minutes. This does not include time spent on separately billable procedures/tests.

## 2024-05-04 ENCOUNTER — PHARMACY VISIT (OUTPATIENT)
Dept: PHARMACY | Facility: MEDICAL CENTER | Age: 74
End: 2024-05-04
Payer: COMMERCIAL

## 2024-05-04 VITALS
SYSTOLIC BLOOD PRESSURE: 91 MMHG | OXYGEN SATURATION: 92 % | HEART RATE: 60 BPM | HEIGHT: 74 IN | WEIGHT: 219.36 LBS | BODY MASS INDEX: 28.15 KG/M2 | RESPIRATION RATE: 18 BRPM | DIASTOLIC BLOOD PRESSURE: 53 MMHG | TEMPERATURE: 98.1 F

## 2024-05-04 LAB
ANION GAP SERPL CALC-SCNC: 9 MMOL/L (ref 7–16)
BUN SERPL-MCNC: 10 MG/DL (ref 8–22)
CALCIUM SERPL-MCNC: 8.7 MG/DL (ref 8.5–10.5)
CHLORIDE SERPL-SCNC: 99 MMOL/L (ref 96–112)
CO2 SERPL-SCNC: 26 MMOL/L (ref 20–33)
CREAT SERPL-MCNC: 0.6 MG/DL (ref 0.5–1.4)
ERYTHROCYTE [DISTWIDTH] IN BLOOD BY AUTOMATED COUNT: 52.1 FL (ref 35.9–50)
GFR SERPLBLD CREATININE-BSD FMLA CKD-EPI: 103 ML/MIN/1.73 M 2
GLUCOSE SERPL-MCNC: 106 MG/DL (ref 65–99)
HCT VFR BLD AUTO: 31.9 % (ref 42–52)
HGB BLD-MCNC: 10 G/DL (ref 14–18)
MAGNESIUM SERPL-MCNC: 1.6 MG/DL (ref 1.5–2.5)
MCH RBC QN AUTO: 23.7 PG (ref 27–33)
MCHC RBC AUTO-ENTMCNC: 31.3 G/DL (ref 32.3–36.5)
MCV RBC AUTO: 75.6 FL (ref 81.4–97.8)
PLATELET # BLD AUTO: 329 K/UL (ref 164–446)
PMV BLD AUTO: 8.5 FL (ref 9–12.9)
POTASSIUM SERPL-SCNC: 3.6 MMOL/L (ref 3.6–5.5)
RBC # BLD AUTO: 4.22 M/UL (ref 4.7–6.1)
SODIUM SERPL-SCNC: 134 MMOL/L (ref 135–145)
WBC # BLD AUTO: 7.4 K/UL (ref 4.8–10.8)

## 2024-05-04 PROCEDURE — 99232 SBSQ HOSP IP/OBS MODERATE 35: CPT | Performed by: INTERNAL MEDICINE

## 2024-05-04 PROCEDURE — RXMED WILLOW AMBULATORY MEDICATION CHARGE: Performed by: INTERNAL MEDICINE

## 2024-05-04 PROCEDURE — 99239 HOSP IP/OBS DSCHRG MGMT >30: CPT | Performed by: INTERNAL MEDICINE

## 2024-05-04 RX ORDER — FUROSEMIDE 20 MG/1
20 TABLET ORAL DAILY
Qty: 90 TABLET | Refills: 0 | Status: SHIPPED | OUTPATIENT
Start: 2024-05-05

## 2024-05-04 RX ORDER — FUROSEMIDE 20 MG/1
20 TABLET ORAL
Status: DISCONTINUED | OUTPATIENT
Start: 2024-05-05 | End: 2024-05-04 | Stop reason: HOSPADM

## 2024-05-04 RX ORDER — LOSARTAN POTASSIUM 25 MG/1
25 TABLET ORAL DAILY
Qty: 90 TABLET | Refills: 0 | Status: SHIPPED | OUTPATIENT
Start: 2024-05-05

## 2024-05-04 RX ORDER — CLOPIDOGREL BISULFATE 75 MG/1
75 TABLET ORAL DAILY
Qty: 180 TABLET | Refills: 0 | Status: SHIPPED | OUTPATIENT
Start: 2024-05-05

## 2024-05-04 RX ORDER — ASPIRIN 81 MG/1
81 TABLET ORAL DAILY
Qty: 30 TABLET | Refills: 0 | Status: SHIPPED | OUTPATIENT
Start: 2024-05-05 | End: 2024-05-04

## 2024-05-04 RX ORDER — LOSARTAN POTASSIUM 25 MG/1
25 TABLET ORAL
Status: DISCONTINUED | OUTPATIENT
Start: 2024-05-04 | End: 2024-05-04 | Stop reason: HOSPADM

## 2024-05-04 RX ORDER — FUROSEMIDE 20 MG/1
20 TABLET ORAL
Status: DISCONTINUED | OUTPATIENT
Start: 2024-05-04 | End: 2024-05-04

## 2024-05-04 RX ORDER — LOSARTAN POTASSIUM 25 MG/1
25 TABLET ORAL DAILY
Qty: 90 TABLET | Refills: 0 | Status: SHIPPED | OUTPATIENT
Start: 2024-05-05 | End: 2024-05-04

## 2024-05-04 RX ORDER — CLOPIDOGREL BISULFATE 75 MG/1
75 TABLET ORAL DAILY
Qty: 180 TABLET | Refills: 0 | Status: SHIPPED | OUTPATIENT
Start: 2024-05-05 | End: 2024-05-04

## 2024-05-04 RX ORDER — FUROSEMIDE 20 MG/1
20 TABLET ORAL DAILY
Qty: 90 TABLET | Refills: 0 | Status: SHIPPED | OUTPATIENT
Start: 2024-05-05 | End: 2024-05-04

## 2024-05-04 RX ORDER — SPIRONOLACTONE 25 MG/1
25 TABLET ORAL DAILY
Qty: 90 TABLET | Refills: 0 | Status: SHIPPED | OUTPATIENT
Start: 2024-05-05 | End: 2024-05-04

## 2024-05-04 RX ORDER — ASPIRIN 81 MG/1
81 TABLET ORAL DAILY
Qty: 30 TABLET | Refills: 0 | Status: SHIPPED | OUTPATIENT
Start: 2024-05-05 | End: 2024-06-04

## 2024-05-04 RX ORDER — SPIRONOLACTONE 25 MG/1
25 TABLET ORAL DAILY
Qty: 90 TABLET | Refills: 0 | Status: SHIPPED | OUTPATIENT
Start: 2024-05-05

## 2024-05-04 RX ADMIN — MONTELUKAST 10 MG: 10 TABLET, FILM COATED ORAL at 05:07

## 2024-05-04 RX ADMIN — CLOPIDOGREL BISULFATE 75 MG: 75 TABLET ORAL at 06:00

## 2024-05-04 RX ADMIN — SPIRONOLACTONE 25 MG: 25 TABLET ORAL at 08:00

## 2024-05-04 RX ADMIN — APIXABAN 5 MG: 5 TABLET, FILM COATED ORAL at 05:08

## 2024-05-04 RX ADMIN — OMEPRAZOLE 20 MG: 20 CAPSULE, DELAYED RELEASE ORAL at 05:07

## 2024-05-04 RX ADMIN — FUROSEMIDE 20 MG: 10 INJECTION INTRAMUSCULAR; INTRAVENOUS at 08:00

## 2024-05-04 RX ADMIN — ASPIRIN 81 MG: 81 TABLET, COATED ORAL at 05:08

## 2024-05-04 RX ADMIN — SOTALOL HYDROCHLORIDE 80 MG: 80 TABLET ORAL at 05:07

## 2024-05-04 RX ADMIN — ALLOPURINOL 300 MG: 300 TABLET ORAL at 05:08

## 2024-05-04 RX ADMIN — LEVOTHYROXINE SODIUM 200 MCG: 0.1 TABLET ORAL at 05:08

## 2024-05-04 RX ADMIN — GUAIFENESIN 1200 MG: 600 TABLET, EXTENDED RELEASE ORAL at 05:07

## 2024-05-04 RX ADMIN — Medication 2000 UNITS: at 05:08

## 2024-05-04 ASSESSMENT — ENCOUNTER SYMPTOMS
CHEST TIGHTNESS: 0
ABDOMINAL PAIN: 0
SHORTNESS OF BREATH: 0
NAUSEA: 0
DIZZINESS: 0
PALPITATIONS: 0
HEADACHES: 0

## 2024-05-04 ASSESSMENT — PAIN DESCRIPTION - PAIN TYPE: TYPE: ACUTE PAIN

## 2024-05-04 ASSESSMENT — FIBROSIS 4 INDEX: FIB4 SCORE: 1.54

## 2024-05-04 NOTE — PROGRESS NOTES
Spoke with patient at bedside who reports has recieved a pneumococcal vaccination in lifetime. Not ordered during admission.     Nathalie Herring, PharmD

## 2024-05-04 NOTE — DISCHARGE INSTRUCTIONS
HF Patient Discharge Instructions  Monitor your weight daily, and maintain a weight chart, to track your weight changes.   Activity as tolerated, unless your Doctor has ordered otherwise.  Follow a low fat, low cholesterol, low salt diet unless instructed otherwise by your Doctor. Read the labels on the back of food products and track your intake of fat, cholesterol and salt.   Fluid Restriction No. If a Fluid Restriction has been ordered by your Doctor, measure fluids with a measuring cup to ensure that you are not exceeding the restriction.   No smoking.  Oxygen No. If your Doctor has ordered that you wear Oxygen at home, it is important to wear it as ordered.  Did you receive an explanation from staff on the importance of taking each of your medications and why it is necessary to keep taking them unless your doctor says to stop? Yes  Were all of your questions answered about how to manage your heart failure and what to do if you have increased signs and symptoms after you go home? Yes  Do you feel like your heart failure care team involved you in the care treatment plan and allowed you to make decisions regarding your care while in the hospital and addressed any discharge needs you might have? Yes    See the educational handout provided at discharge for more information on monitoring your daily weight, activity and diet. This also explains more about Heart Failure, symptoms of a flare-up and some of the tests that you have undergone.     Warning Signs of a Flare-Up include:  Swelling in the ankles or lower legs.  Shortness of breath, while at rest, or while doing normal activities.   Shortness of breath at night when in bed, or coughing in bed.   Requiring more pillows to sleep at night, or needing to sit up at night to sleep.  Feeling weak, dizzy or fatigued.     When to call your Doctor:  Call your Primary Care Physician or Cardiologist if:   You experience any pain radiating to your jaw or neck.  You have  any difficulty breathing.  You experience weight gain of 3 lbs in a day or 5 lbs in a week.   You feel any palpitations or irregular heartbeats.  You become dizzy or lose consciousness.   If you have had an angiogram or had a pacemaker or AICD placed, and experience:  Bleeding, drainage or swelling at the surgical / puncture site.  Fever greater than 100.0 F  Shock from internal defibrillator.  Cool and / or numb extremities.     Please access the [facility] My HF Guide/Heart Failure Interactive Workbook:   [email or web address]

## 2024-05-04 NOTE — PROGRESS NOTES
Bedside report received from off going RN/tech: LOLA Montano, assumed care of patient.     Fall Risk Score: MODERATE RISK  Fall risk interventions in place: Provide patient/family education based on risk assessment  Bed type: Regular (Sam Score less than 17 interventions in place)  Patient on cardiac monitor: Yes  IVF/IV medications: Not Applicable   Oxygen: How many liters 0.5L and Traced the line to wall oxygen  Bedside sitter: Not Applicable   Isolation: Not applicable

## 2024-05-04 NOTE — DISCHARGE SUMMARY
Discharge Summary    CHIEF COMPLAINT ON ADMISSION  Chief Complaint   Patient presents with    Shortness of Breath     Patient sent from urgent care for bilateral lobe pneumonia reading on XRAY. Patient reports ablation on 3/4/2024. Reports cough, shortness or breath, and mucus production since ablation. Denies chest pain and fever.       Reason for Admission  Abnormal labs     Admission Date  4/28/2024    CODE STATUS  Full Code    HPI & HOSPITAL COURSE  Ervin Ly is a 73 y.o. male with chronic atrial fibrillation on Eliquis and sotalol with history of prior ablation, HTN, HLD, gout, GERD, new diagnosis of colon cancer (that has not yet been treated) and reactive airway disease, who presented to [facility] on 4/28/2024 due to shortness of breath and not  feeling well for the past 3 weeks with subjective fever, chills, productive sputum, and weakness. He reported traveling to [place] recently, but denied sick contact. He has been living in his  and was planning to drive from [place] to [place]. In [place] at the  park he became very weak and dizzy, he require assistance getting into his car and then proceeded to drive while he was symptomatic to [place]. Here, he was seen at urgent care due to concern of multifocal pneumonia on CXR and was hypoxic and he was sent to the ED.  On evaluation, he was requiring 2 to 4 L of oxygen by nasal cannula.  WBC was 18,400.  Sodium 134, potassium 3.3, BNP of 2655, and troponin was 25.  Chest x-ray showed bilateral perihilar and lower lobe parenchymal opacities.  Patient was started on antibiotics.   Blood cultures remained negative.  Sputum cultures grew MSSA. Echocardiogram was obtained which showed reduced EF of 45%, with no prior history of CHF.  He was started on diuresis, and cardiology was consulted who recommended ischemic workup with MPI. He diuresed well.  Lipid profile was at goal (LDL of 36). Nuclear cardiac stress test showed no evidence of significant jeopardized  viable myocardium or prior MI.  Cardiology gave the opinion that despite stress test negative, cannot rule out balanced ischemia, and recommended proceeding with [facility]. Patient had cardiac catheterization 5/3/2024 which showed 80% focal stenosis on the distal LAD and 80% stenosis in the proximal marginal branch of the left circumflex, s/p PCI with LOUISE x 2.  Patient started on DAPT with aspirin and Plavix.  He is started on Aldactone.  He was diuresed to the point of euvolemia, and was transitioned to oral Lasix.  Cardiology recommended DAPT with aspirin and Plavix for 1 month then change to Eliquis and Plavix after, given high risk for bleeding with triple therapy.    He clinically improved. WBC count has normalized, and procalcitonin became low.  He completed course of antibiotics.   He has completed course of antibiotics.  He was able to be weaned off supplemental oxygen.  His creatinine remained normal.    I have personally seen and examined the patient on the day of discharge. With his clinical improvement, he was deemed ready to discharge from the hospital as he did not have any further hospitalization needs. Patient felt comfortable going home. The discharge plan was discussed with the patient, with which he was agreeable to.     Therefore, he is discharged in good and stable condition to home with close outpatient follow-up.    The patient met 2-midnight criteria for an inpatient stay at the time of discharge.    Discharge Date  5/4/2024      FOLLOW UP ITEMS POST DISCHARGE  -Continue DAPT with aspirin and Plavix for 1 month, then change to Eliquis and Plavix thereafter indefinitely.  -Continue GDMT.  Continue sotalol, Aldactone, losartan, and oral Lasix.  Holding off on beta-blocker due to bradycardia.  Holding off on SGLT2i as EF>40%, consider starting as outpatient.  -He will establish care with a cardiologist in [place] where he is planning to settle permanently.  - counseled to seek immediate medical  attention, or return to the ED for recurrent or worsening symptoms.      DISCHARGE DIAGNOSES  Principal Problem:    Multifocal pneumonia (POA: Yes)  Active Problems:    Acute respiratory failure with hypoxia (HCC) (POA: Yes)    Acute HFrEF (heart failure with reduced ejection fraction) (HCC) (POA: Yes)    Hypokalemia (POA: Yes)    Elevated troponin (POA: Yes)    QT prolongation (POA: Yes)    Chronic a-fib (HCC) (POA: Yes)    Hyperlipidemia (POA: Yes)    Hypothyroidism (POA: Yes)    Hypertension (POA: Yes)    Reactive airway disease (POA: Yes)    ACP (advance care planning) (POA: Yes)    Acquired circulating anticoagulants (HCC) (POA: Yes)    Severe protein-calorie malnutrition (HCC) (POA: Yes)  Resolved Problems:    * No resolved hospital problems. *      FOLLOW UP  No future appointments.  [facility], Emergency Dept  [address] [id number] [phone]    If symptoms worsen    Establish with Cardiology in [place]    PCP    Schedule an appointment as soon as possible for a visit in 1 week(s)        MEDICATIONS ON DISCHARGE     Medication List        START taking these medications        Instructions   aspirin 81 MG EC tablet  Start taking on: May 5, 2024   Take 1 Tablet by mouth every day for 30 days.  Dose: 81 mg     clopidogrel 75 MG Tabs  Start taking on: May 5, 2024  Commonly known as: Plavix   Take 1 Tablet by mouth every day.  Dose: 75 mg     furosemide 20 MG Tabs  Start taking on: May 5, 2024  Commonly known as: Lasix   Take 1 Tablet by mouth every day.  Dose: 20 mg     losartan 25 MG Tabs  Start taking on: May 5, 2024  Commonly known as: Cozaar   Take 1 Tablet by mouth every day.  Dose: 25 mg     spironolactone 25 MG Tabs  Start taking on: May 5, 2024  Commonly known as: Aldactone   Take 1 Tablet by mouth every day.  Dose: 25 mg            CHANGE how you take these medications        Instructions   apixaban 5mg Tabs  Start taking on: June 4, 2024  What changed:    additional instructions  These instructions start on June 4, 2024. If you are unsure what to do until then, ask your doctor or other care provider.  Commonly known as: Eliquis   Take 1 Tablet by mouth 2 times a day. Start in 1 month (6/4/24) after stopping aspirin.  Dose: 5 mg            CONTINUE taking these medications        Instructions   allopurinol 300 MG Tabs  Commonly known as: Zyloprim   Take 1 tablet every day by oral route as directed for 90 days.     docusate sodium 100 MG Caps   Take 100 mg by mouth every day.  Dose: 100 mg     levothyroxine 200 MCG Tabs  Commonly known as: Synthroid   Take 200 mcg by mouth every day.  Dose: 200 mcg     montelukast 10 MG Tabs  Commonly known as: Singulair   Take 10 mg by mouth every day.  Dose: 10 mg     NexIUM 40 MG delayed-release capsule  Generic drug: esomeprazole   Take 40 mg by mouth every day.  Dose: 40 mg     ProAir  (90 Base) MCG/ACT Aers inhalation aerosol  Generic drug: albuterol   Inhale 1-2 Puffs every four hours as needed for Shortness of Breath.  Dose: 1-2 Puff     rosuvastatin 10 MG Tabs  Commonly known as: Crestor   Take 1 tablet every day by oral route in the evening for 90 days.     sotalol 80 MG Tabs  Commonly known as: Betapace   Take 1 Tablet by mouth 2 times a day.  Dose: 1 Tablet     Vitamin D3 50 MCG (2000 UT) Tabs   Take 2,000 Units by mouth every day.  Dose: 2,000 Units            STOP taking these medications      metoprolol tartrate 25 MG Tabs  Commonly known as: Lopressor              Allergies  No Known Allergies    DIET  Orders Placed This Encounter   Procedures    Diet Order Diet: Cardiac     Standing Status:   Standing     Number of Occurrences:   1     Order Specific Question:   Diet:     Answer:   Cardiac [6]       ACTIVITY  As tolerated.  Weight bearing as tolerated    CONSULTATIONS  Cardiology    PROCEDURES  As above    LABORATORY  Lab Results   Component Value Date    SODIUM 134 (L) 05/04/2024    POTASSIUM 3.6 05/04/2024     CHLORIDE 99 05/04/2024    CO2 26 05/04/2024    GLUCOSE 106 (H) 05/04/2024    BUN 10 05/04/2024    CREATININE 0.60 05/04/2024        Lab Results   Component Value Date    WBC 7.4 05/04/2024    HEMOGLOBIN 10.0 (L) 05/04/2024    HEMATOCRIT 31.9 (L) 05/04/2024    PLATELETCT 329 05/04/2024        Total time of the discharge process = 51 minutes.

## 2024-05-04 NOTE — PROGRESS NOTES
Pt back from cath lab with cath lab RN, tele box on, monitor room notified, post op vitals started

## 2024-05-04 NOTE — PROGRESS NOTES
Cardiology Follow Up Progress Note    Date of Service  5/4/2024    Attending Physician  William Ott M.D.    NAHID Mueller Dontrell Ly is a 73 y.o. male admitted 4/28/2024 with a past medical history of atrial fibrillation diagnosed 9/2023, DCCV (Critical access hospital), Afib ablation 3/4/2024 ([facility], Todd Vilchis MD), GI bleed 2023 ([place], [place]) who presented 4/28/2024 with persistent productive cough, hypoxia, abnormal chest x-ray diagnosed with pneumonia and started on antibiotics.  Had a abnormal echocardiogram showing left ventricular ejection fraction of 45% mild aortic stenosis.  Cardiology consultation was requested.    Interim Events  4/30: /69.  HR 68.  Sinus.  UO 1600 cc with IV furosemide.  Persistent intermittent cough.  No shortness of breath or chest pain.  5/1: /64.  HR 54.  Sinus.  Overall feels better.  Less cough.  No chest pain or shortness of breath but is not ambulated.  O2 sat 90% on 2.5 L..  Occult blood negative.  5/2: /71.  HR 59.  Sinus.  Overall feeling better.  Minimal cough.  No PND, orthopnea or LE edema.  No chest pain.  Follow-up CXR suggests pneumonitis, edema, with small left pleural effusion.  5/3: [facility] PCI  5/4: /58.  HR 60.  Sinus.  Tolerated PCI, yesterday.    Review of Systems  Review of Systems   Respiratory:  Negative for chest tightness and shortness of breath.    Cardiovascular:  Negative for palpitations.   Gastrointestinal:  Negative for abdominal pain and nausea.   Neurological:  Negative for dizziness and headaches.       Vital signs in last 24 hours  Temp:  [36.2 °C (97.2 °F)-37.1 °C (98.8 °F)] 36.7 °C (98.1 °F)  Pulse:  [58-69] 60  Resp:  [17-22] 18  BP: ()/(53-80) 91/53  SpO2:  [88 %-94 %] 92 %    Physical Exam  Physical Exam  Constitutional:       General: He is not in acute distress.  Neck:      Comments: Normal JVP  Cardiovascular:      Rate and Rhythm: Normal rate and regular rhythm.      Heart sounds: S1  "normal and S2 normal. Murmur heard.      No friction rub. No gallop.   Pulmonary:      Effort: Pulmonary effort is normal.      Breath sounds: Rales present. No wheezing or rhonchi.      Comments: Rales left base.  Musculoskeletal:      Right lower leg: No edema.      Left lower leg: No edema.   Skin:     General: Skin is warm and dry.   Neurological:      Mental Status: He is alert and oriented to person, place, and time.   Psychiatric:         Behavior: Behavior normal.         Lab Review  Lab Results   Component Value Date/Time    WBC 7.4 05/04/2024 01:34 AM    RBC 4.22 (L) 05/04/2024 01:34 AM    HEMOGLOBIN 10.0 (L) 05/04/2024 01:34 AM    HEMATOCRIT 31.9 (L) 05/04/2024 01:34 AM    MCV 75.6 (L) 05/04/2024 01:34 AM    MCH 23.7 (L) 05/04/2024 01:34 AM    MCHC 31.3 (L) 05/04/2024 01:34 AM    MPV 8.5 (L) 05/04/2024 01:34 AM      Lab Results   Component Value Date/Time    SODIUM 134 (L) 05/04/2024 01:34 AM    POTASSIUM 3.6 05/04/2024 01:34 AM    CHLORIDE 99 05/04/2024 01:34 AM    CO2 26 05/04/2024 01:34 AM    GLUCOSE 106 (H) 05/04/2024 01:34 AM    BUN 10 05/04/2024 01:34 AM    CREATININE 0.60 05/04/2024 01:34 AM      Lab Results   Component Value Date/Time    ASTSGOT 16 04/28/2024 01:21 PM    ALTSGPT 5 04/28/2024 01:21 PM     Lab Results   Component Value Date/Time    CHOLSTRLTOT 75 (L) 04/30/2024 03:48 AM    LDL 36 04/30/2024 03:48 AM    HDL 26 (A) 04/30/2024 03:48 AM    TRIGLYCERIDE 67 04/30/2024 03:48 AM    TROPONINT 25 (H) 04/29/2024 07:46 AM       No results for input(s): \"NTPROBNP\" in the last 72 hours.    Cardiac Imaging and Procedures Review  EKG:  My personal interpretation of the EKG dated 4/29/2024 is sinus rhythm, rate 69, QTc 533 ms    Rhythm: My personal interpretation of the rhythm dated 4/30/2024 is sinus rhythm, rate 58.    Rhythm: My personal interpretation of the rhythm dated 5/1/2024 is sinus rhythm.    Rhythm: My personal interpretation of the rhythm dated 5/2/2024 is sinus rhythm   "   Echocardiogram 8/26/2023 ([facility])  Left ventricular ejection fraction 60-65%.  Left ventricular wall motion is normal  Right ventricular size and systolic function are normal.  No valvular abnormalities.     [facility]ch[facility]/2023 [facility]  Left ventricular ejection fraction 50-55%  Moderate concentric left ventricular hypertrophy.  Normal right ventricular systolic function  Aortic sclerosis, trace aortic regurgitation  Mild pulmonary hypertension     Echocardiogram: 4/29/2024  Mildly reduced left ventricular systolic function.  Mild aortic valve stenosis.  Transvalvular gradients are - Peak: 22 mmHg, Mean: 13 mmHg.     Coronary arteriograms:  Left main: normal  Left anterior descending: Severely calcified in the proximal portion but no significant disease.  Distal LAD has focal 80% stenosis.  Left circumflex: Large vessel, first marginal branch has 80% stenosis in the proximal portion.  Second marginal branch is free of significant disease  Right coronary: 20 to 30% stenosis in midportion, dominant  PCI:   Placement of 3.0 x 15 mm [place] drug-eluting stent in distal LAD.  Placement of 2.5 x 12 mm [place] drug-eluting stent in the proximal marginal branch.    Imaging  Chest X-Ray: 4/28/2024  1. There are bilateral perihilar and lower lobe parenchymal opacities which could be due to combination of edema and multifocal pneumonia.     Chest CTA 2/23/2024 ([facility] [place] [facility])  1.  Normal pulmonary venous anatomy.  Diffuse bronchial wall thickening with scattered multifocal subsolid groundglass pulmonary nodules throughout all lung fields which appear clustered/tree-in-bud configuration.  Findings favor infectious/inflammatory etiologies  Moderate cardiomegaly with severe coronary calcification and small pericardial effusion  Additional more prominent and suspicious appearing pulmonary nodules are seen measuring 10-11 mm in size.  Mildly prominent mediastinal and right hilar lymph  nods.     MPI 12/4/2023 ([place])  Left ventricular ejection fraction 33%  Small fixed defect.     MPI4/30/2024   Mildly reduced ejection fraction.      No evidence of significant jeopardized viable myocardium or prior myocardial    infarction.   ECG INTERPRETATION   Nondiagnostic stress ECG with regadenosoMild global hypokinesis. LV ejection fraction = 43%.     Assessment  CAD  PCI, distal LAD 3.0 x 15 mm, OM 2.5 x 12 mm LOUISE  Hypoxic respiratory failure  Pneumonia  HFpEF  Mild LV dysfunction LVEF 45%  Aortic stenosis, mild  Abnormal chest CTA suspicious for interstitial or inflammatory processes  PAF  Afib ablation 3/4/2024  Antiarrhythmic therapy with sotalol  Anticoagulation on apixaban  Coronary calcification  Anemia, microcytic  H/O GI bleed 2023  Hypothyroidism, S/P I-131  Dyslipidemia     Recommendation Discussion  Clinically improved  Reviewed coronary angiogram images and results with the patient  Room air O2 sat now 92%.  Maintaining sinus rhythm on sotalol.  Uncertain of PAF burden but is likely to be low in the setting of ablation and antiarrhythmic therapy making it relatively safe to hold apixaban for period of time.  In the setting of significant anemia and high risk of bleeding with triple anticoagulation/antiplatelet therapy would opt to manage the patient with DAPT for 1 month holding apixaban during this time after which would restart apixaban and continue clopidogrel  Heart failure improved with good diuresis 2200 cc.  Continue GDMT for Heart Failure as tolerated  Switching to p.o. furosemide  Continue to monitor renal function.  Discussed treatment plan with William Ott MD     Thank you for allowing me to participate in the care of this patient.     Please contact me with any questions.     Claude Billings M.D.   Cardiologist, [facility]  [phone]

## 2024-05-04 NOTE — CARE PLAN
The patient is Watcher - Medium risk of patient condition declining or worsening    Shift Goals  Clinical Goals: wean o2, cath  Patient Goals: cath  Family Goals: jerome    Progress made toward(s) clinical / shift goals:    Problem: Knowledge Deficit - Standard  Goal: Patient and family/care givers will demonstrate understanding of plan of care, disease process/condition, diagnostic tests and medications  Outcome: Progressing     Problem: Skin Integrity  Goal: Skin integrity is maintained or improved  Outcome: Progressing     Problem: Pain - Standard  Goal: Alleviation of pain or a reduction in pain to the patient’s comfort goal  Outcome: Progressing       Patient is not progressing towards the following goals:

## 2024-05-04 NOTE — DIETARY
Nutrition Services: Heart Healthy Diet Education Consult   Day 6 of admit.  Ervin Ly is a 73 y.o. male with admitting DX of Multifocal pneumonia [J18.9]    RD able to visit pt at bedside to provide heart healthy diet education. RD discussed saturated fats vs unsaturated fats, soluble fiber foods, and lower sodium foods vs higher sodium foods. RD provided handout reinforcing topics discussed. Pt demonstrated readiness and evidence of learning. RD able to answer all questions to patient's satisfaction.     No other education needs identified at this time. Consider referral to outpatient nutrition services for continuation of education as indicated or per pt preferences.     Please re-consult RD as indicated.

## 2024-05-06 ENCOUNTER — PATIENT OUTREACH (OUTPATIENT)
Dept: SCHEDULING | Facility: IMAGING CENTER | Age: 74
End: 2024-05-06
Payer: MEDICARE

## 2024-05-06 ENCOUNTER — TELEPHONE (OUTPATIENT)
Dept: CARDIOLOGY | Facility: MEDICAL CENTER | Age: 74
End: 2024-05-06
Payer: MEDICARE

## 2024-05-06 DIAGNOSIS — I48.20 CHRONIC A-FIB (HCC): ICD-10-CM

## 2024-05-06 DIAGNOSIS — I50.21 ACUTE HFREF (HEART FAILURE WITH REDUCED EJECTION FRACTION) (HCC): ICD-10-CM

## 2024-05-06 DIAGNOSIS — J96.01 ACUTE RESPIRATORY FAILURE WITH HYPOXIA (HCC): ICD-10-CM

## 2024-05-06 NOTE — TELEPHONE ENCOUNTER
Kilo Olivo M.D.  You32 minutes ago (12:06 PM)     Ok to place referral     Referral placed at this time for patient

## 2024-05-06 NOTE — TELEPHONE ENCOUNTER
"AK    Caller:  Ervin    Topic/issue: Ervin is moving to [facility] and is requesting a urgent referral so he can be seen sooner, as a new Patient there:    \" [facility]\" (Fax - [phone])     Callback Number: [phone]    Thank you,   Kaylen OLIVER"